# Patient Record
Sex: FEMALE | Race: WHITE | NOT HISPANIC OR LATINO | Employment: FULL TIME | URBAN - METROPOLITAN AREA
[De-identification: names, ages, dates, MRNs, and addresses within clinical notes are randomized per-mention and may not be internally consistent; named-entity substitution may affect disease eponyms.]

---

## 2023-12-13 ENCOUNTER — TELEPHONE (OUTPATIENT)
Dept: PRIMARY CARE | Facility: CLINIC | Age: 63
End: 2023-12-13
Payer: COMMERCIAL

## 2023-12-19 DIAGNOSIS — I10 BENIGN HYPERTENSION: ICD-10-CM

## 2023-12-19 DIAGNOSIS — E03.8 OTHER SPECIFIED HYPOTHYROIDISM: Primary | ICD-10-CM

## 2023-12-24 RX ORDER — LEVOTHYROXINE SODIUM 112 UG/1
112 TABLET ORAL DAILY
Qty: 90 TABLET | Refills: 0 | Status: SHIPPED | OUTPATIENT
Start: 2023-12-24 | End: 2024-01-23 | Stop reason: SDUPTHER

## 2023-12-24 RX ORDER — HYDROCHLOROTHIAZIDE 12.5 MG/1
12.5 TABLET ORAL DAILY
Qty: 90 TABLET | Refills: 0 | Status: SHIPPED | OUTPATIENT
Start: 2023-12-24 | End: 2024-03-06

## 2023-12-24 RX ORDER — AMLODIPINE BESYLATE 10 MG/1
10 TABLET ORAL DAILY
Qty: 90 TABLET | Refills: 0 | Status: SHIPPED | OUTPATIENT
Start: 2023-12-24 | End: 2024-03-06

## 2024-01-03 ENCOUNTER — ANCILLARY PROCEDURE (OUTPATIENT)
Dept: RADIOLOGY | Facility: CLINIC | Age: 64
End: 2024-01-03
Payer: COMMERCIAL

## 2024-01-03 DIAGNOSIS — R91.8 OTHER NONSPECIFIC ABNORMAL FINDING OF LUNG FIELD: ICD-10-CM

## 2024-01-03 PROCEDURE — 71271 CT THORAX LUNG CANCER SCR C-: CPT

## 2024-01-08 ENCOUNTER — TELEPHONE (OUTPATIENT)
Dept: PRIMARY CARE | Facility: CLINIC | Age: 64
End: 2024-01-08
Payer: COMMERCIAL

## 2024-01-09 ENCOUNTER — TELEPHONE (OUTPATIENT)
Dept: PRIMARY CARE | Facility: CLINIC | Age: 64
End: 2024-01-09
Payer: COMMERCIAL

## 2024-01-11 ENCOUNTER — TELEPHONE (OUTPATIENT)
Dept: PRIMARY CARE | Facility: CLINIC | Age: 64
End: 2024-01-11
Payer: COMMERCIAL

## 2024-01-11 DIAGNOSIS — R91.8 MULTIPLE LUNG NODULES: Primary | ICD-10-CM

## 2024-01-11 NOTE — TELEPHONE ENCOUNTER
----- Message from VELIA Villegas-CNP sent at 1/11/2024  2:17 PM EST -----  Regarding: please schedule for annual exam  Please schedule for annual exam

## 2024-01-16 PROBLEM — K21.9 GASTROESOPHAGEAL REFLUX DISEASE: Status: ACTIVE | Noted: 2020-11-17

## 2024-01-16 PROBLEM — E03.9 HYPOTHYROIDISM: Status: ACTIVE | Noted: 2023-02-06

## 2024-01-16 PROBLEM — S83.262A: Status: ACTIVE | Noted: 2024-01-16

## 2024-01-16 PROBLEM — J34.2 NASAL SEPTAL DEVIATION: Status: ACTIVE | Noted: 2024-01-16

## 2024-01-16 PROBLEM — M54.30 SCIATICA: Status: ACTIVE | Noted: 2024-01-16

## 2024-01-16 PROBLEM — J06.9 UPPER RESPIRATORY TRACT INFECTION: Status: ACTIVE | Noted: 2024-01-16

## 2024-01-16 PROBLEM — L65.9 NONSCARRING HAIR LOSS, UNSPECIFIED: Status: ACTIVE | Noted: 2017-10-23

## 2024-01-16 PROBLEM — I10 HYPERTENSION, BENIGN: Status: ACTIVE | Noted: 2020-11-17

## 2024-01-16 PROBLEM — R09.89 RIGHT CAROTID BRUIT: Status: ACTIVE | Noted: 2024-01-16

## 2024-01-16 PROBLEM — R69 FEELING SICK: Status: ACTIVE | Noted: 2024-01-16

## 2024-01-16 PROBLEM — L82.1 OTHER SEBORRHEIC KERATOSIS: Status: ACTIVE | Noted: 2017-10-23

## 2024-01-16 PROBLEM — E78.5 HYPERLIPIDEMIA: Status: ACTIVE | Noted: 2020-11-17

## 2024-01-16 PROBLEM — M16.9 OSTEOARTHRITIS OF HIP: Status: ACTIVE | Noted: 2024-01-16

## 2024-01-16 PROBLEM — M25.562 LEFT KNEE PAIN: Status: ACTIVE | Noted: 2024-01-16

## 2024-01-16 PROBLEM — R53.83 FATIGUE: Status: ACTIVE | Noted: 2023-02-06

## 2024-01-16 PROBLEM — F19.21 HISTORY OF SUBSTANCE DEPENDENCE (MULTI): Status: ACTIVE | Noted: 2024-01-16

## 2024-01-16 PROBLEM — S83.222A PERIPHERAL TEAR OF MEDIAL MENISCUS OF LEFT KNEE, INITIAL ENCOUNTER: Status: ACTIVE | Noted: 2024-01-16

## 2024-01-16 PROBLEM — Z86.19 HISTORY OF VARICELLA: Status: ACTIVE | Noted: 2024-01-16

## 2024-01-16 PROBLEM — D22.5 MELANOCYTIC NEVI OF TRUNK: Status: ACTIVE | Noted: 2017-10-23

## 2024-01-16 PROBLEM — J34.89 NASAL DRAINAGE: Status: ACTIVE | Noted: 2024-01-16

## 2024-01-16 PROBLEM — R68.89 FEELING SICK: Status: ACTIVE | Noted: 2024-01-16

## 2024-01-16 PROBLEM — M70.60 GREATER TROCHANTERIC BURSITIS: Status: ACTIVE | Noted: 2024-01-16

## 2024-01-16 PROBLEM — L73.8 OTHER SPECIFIED FOLLICULAR DISORDERS: Status: ACTIVE | Noted: 2017-10-23

## 2024-01-16 PROBLEM — M25.559 ARTHRALGIA OF HIP: Status: ACTIVE | Noted: 2024-01-16

## 2024-01-16 PROBLEM — R31.9 HEMATURIA: Status: ACTIVE | Noted: 2023-02-06

## 2024-01-16 PROBLEM — Z86.39 HISTORY OF THYROID DISORDER: Status: ACTIVE | Noted: 2024-01-16

## 2024-01-16 PROBLEM — R09.89 CAROTID BRUIT: Status: ACTIVE | Noted: 2024-01-16

## 2024-01-16 PROBLEM — E66.3 OVERWEIGHT: Status: ACTIVE | Noted: 2024-01-16

## 2024-01-16 PROBLEM — I44.7 LEFT BUNDLE BRANCH BLOCK (LBBB) ON ELECTROCARDIOGRAM: Status: ACTIVE | Noted: 2024-01-16

## 2024-01-16 PROBLEM — M54.31 SCIATICA OF RIGHT SIDE: Status: ACTIVE | Noted: 2024-01-16

## 2024-01-16 PROBLEM — R29.818 SUSPECTED SLEEP APNEA: Status: ACTIVE | Noted: 2024-01-16

## 2024-01-16 PROBLEM — M25.531 RIGHT WRIST PAIN: Status: ACTIVE | Noted: 2024-01-16

## 2024-01-16 PROBLEM — R69 DISEASE SUSPECTED: Status: ACTIVE | Noted: 2024-01-16

## 2024-01-16 PROBLEM — M25.552 LEFT HIP PAIN: Status: ACTIVE | Noted: 2024-01-16

## 2024-01-16 PROBLEM — Z86.79 HISTORY OF HYPERTENSION: Status: ACTIVE | Noted: 2024-01-16

## 2024-01-16 PROBLEM — J34.3 HYPERTROPHY OF BOTH INFERIOR NASAL TURBINATES: Status: ACTIVE | Noted: 2024-01-16

## 2024-01-16 PROBLEM — D23.9 OTHER BENIGN NEOPLASM OF SKIN, UNSPECIFIED: Status: ACTIVE | Noted: 2017-10-23

## 2024-01-16 PROBLEM — R73.09 OTHER ABNORMAL GLUCOSE: Status: ACTIVE | Noted: 2023-02-06

## 2024-01-16 PROBLEM — K64.4 EXTERNAL HEMORRHOIDS: Status: ACTIVE | Noted: 2022-11-16

## 2024-01-16 PROBLEM — L81.4 OTHER MELANIN HYPERPIGMENTATION: Status: ACTIVE | Noted: 2017-10-23

## 2024-01-16 PROBLEM — R06.83 SNORING: Status: ACTIVE | Noted: 2024-01-16

## 2024-01-16 PROBLEM — D18.01 HEMANGIOMA OF SKIN AND SUBCUTANEOUS TISSUE: Status: ACTIVE | Noted: 2017-10-23

## 2024-01-16 PROBLEM — E55.9 VITAMIN D DEFICIENCY: Status: ACTIVE | Noted: 2023-02-06

## 2024-01-16 PROBLEM — R31.21 ASYMPTOMATIC MICROSCOPIC HEMATURIA: Status: ACTIVE | Noted: 2024-01-16

## 2024-01-16 PROBLEM — I44.7 LEFT BUNDLE BRANCH BLOCK (LBBB): Status: ACTIVE | Noted: 2023-02-06

## 2024-01-16 RX ORDER — ROSUVASTATIN CALCIUM 10 MG/1
TABLET, COATED ORAL
COMMUNITY
Start: 2021-11-03 | End: 2024-01-17 | Stop reason: SINTOL

## 2024-01-16 RX ORDER — LISINOPRIL 20 MG/1
TABLET ORAL
COMMUNITY
Start: 2018-12-14

## 2024-01-16 RX ORDER — LORATADINE 10 MG/1
1 TABLET ORAL DAILY PRN
COMMUNITY

## 2024-01-16 RX ORDER — LUTEIN 6 MG
TABLET ORAL
COMMUNITY
Start: 2005-11-16 | End: 2024-01-17

## 2024-01-16 RX ORDER — SODIUM, POTASSIUM,MAG SULFATES 17.5-3.13G
SOLUTION, RECONSTITUTED, ORAL ORAL
COMMUNITY
Start: 2022-10-04 | End: 2024-01-17 | Stop reason: ALTCHOICE

## 2024-01-16 RX ORDER — ERGOCALCIFEROL 1.25 MG/1
CAPSULE ORAL
COMMUNITY
Start: 2018-08-29

## 2024-01-16 RX ORDER — MULTIVITAMIN WITH IRON
TABLET ORAL
COMMUNITY
Start: 2005-11-16

## 2024-01-16 RX ORDER — VALACYCLOVIR HYDROCHLORIDE 1 G/1
TABLET, FILM COATED ORAL
COMMUNITY
Start: 2023-10-06 | End: 2024-01-17 | Stop reason: ALTCHOICE

## 2024-01-17 ENCOUNTER — LAB (OUTPATIENT)
Dept: LAB | Facility: LAB | Age: 64
End: 2024-01-17
Payer: COMMERCIAL

## 2024-01-17 ENCOUNTER — OFFICE VISIT (OUTPATIENT)
Dept: PRIMARY CARE | Facility: CLINIC | Age: 64
End: 2024-01-17
Payer: COMMERCIAL

## 2024-01-17 VITALS
BODY MASS INDEX: 30.99 KG/M2 | HEIGHT: 65 IN | WEIGHT: 186 LBS | OXYGEN SATURATION: 98 % | TEMPERATURE: 97.9 F | SYSTOLIC BLOOD PRESSURE: 123 MMHG | RESPIRATION RATE: 16 BRPM | HEART RATE: 71 BPM | DIASTOLIC BLOOD PRESSURE: 77 MMHG

## 2024-01-17 DIAGNOSIS — Z86.39 HISTORY OF THYROID DISORDER: ICD-10-CM

## 2024-01-17 DIAGNOSIS — I10 HYPERTENSION, BENIGN: ICD-10-CM

## 2024-01-17 DIAGNOSIS — Z12.31 SCREENING MAMMOGRAM FOR BREAST CANCER: ICD-10-CM

## 2024-01-17 DIAGNOSIS — Z00.00 ANNUAL PHYSICAL EXAM: ICD-10-CM

## 2024-01-17 DIAGNOSIS — E78.49 OTHER HYPERLIPIDEMIA: ICD-10-CM

## 2024-01-17 DIAGNOSIS — Z13.89 SCREENING FOR HEMATURIA OR PROTEINURIA: ICD-10-CM

## 2024-01-17 DIAGNOSIS — Z23 IMMUNIZATION DUE: ICD-10-CM

## 2024-01-17 DIAGNOSIS — Z00.00 ANNUAL PHYSICAL EXAM: Primary | ICD-10-CM

## 2024-01-17 LAB
ALBUMIN SERPL BCP-MCNC: 4.5 G/DL (ref 3.4–5)
ALP SERPL-CCNC: 62 U/L (ref 33–136)
ALT SERPL W P-5'-P-CCNC: 15 U/L (ref 7–45)
ANION GAP SERPL CALC-SCNC: 12 MMOL/L (ref 10–20)
AST SERPL W P-5'-P-CCNC: 13 U/L (ref 9–39)
BILIRUB SERPL-MCNC: 0.6 MG/DL (ref 0–1.2)
BUN SERPL-MCNC: 17 MG/DL (ref 6–23)
CALCIUM SERPL-MCNC: 9.4 MG/DL (ref 8.6–10.3)
CHLORIDE SERPL-SCNC: 105 MMOL/L (ref 98–107)
CHOLEST SERPL-MCNC: 218 MG/DL (ref 0–199)
CHOLESTEROL/HDL RATIO: 3.6
CO2 SERPL-SCNC: 27 MMOL/L (ref 21–32)
CREAT SERPL-MCNC: 0.72 MG/DL (ref 0.5–1.05)
EGFRCR SERPLBLD CKD-EPI 2021: >90 ML/MIN/1.73M*2
ERYTHROCYTE [DISTWIDTH] IN BLOOD BY AUTOMATED COUNT: 12.6 % (ref 11.5–14.5)
EST. AVERAGE GLUCOSE BLD GHB EST-MCNC: 111 MG/DL
GLUCOSE SERPL-MCNC: 93 MG/DL (ref 74–99)
HBA1C MFR BLD: 5.5 %
HCT VFR BLD AUTO: 43.8 % (ref 36–46)
HDLC SERPL-MCNC: 61.2 MG/DL
HGB BLD-MCNC: 14.2 G/DL (ref 12–16)
LDLC SERPL CALC-MCNC: 143 MG/DL
MCH RBC QN AUTO: 32.1 PG (ref 26–34)
MCHC RBC AUTO-ENTMCNC: 32.4 G/DL (ref 32–36)
MCV RBC AUTO: 99 FL (ref 80–100)
NON HDL CHOLESTEROL: 157 MG/DL (ref 0–149)
NRBC BLD-RTO: 0 /100 WBCS (ref 0–0)
PLATELET # BLD AUTO: 359 X10*3/UL (ref 150–450)
POC APPEARANCE, URINE: CLEAR
POC BILIRUBIN, URINE: NEGATIVE
POC BLOOD, URINE: NEGATIVE
POC COLOR, URINE: YELLOW
POC GLUCOSE, URINE: NEGATIVE MG/DL
POC KETONES, URINE: NEGATIVE MG/DL
POC LEUKOCYTES, URINE: NEGATIVE
POC NITRITE,URINE: NEGATIVE
POC PH, URINE: 6 PH
POC PROTEIN, URINE: NEGATIVE MG/DL
POC SPECIFIC GRAVITY, URINE: 1.02
POC UROBILINOGEN, URINE: 0.2 EU/DL
POTASSIUM SERPL-SCNC: 4.6 MMOL/L (ref 3.5–5.3)
PROT SERPL-MCNC: 7 G/DL (ref 6.4–8.2)
RBC # BLD AUTO: 4.42 X10*6/UL (ref 4–5.2)
SODIUM SERPL-SCNC: 139 MMOL/L (ref 136–145)
TRIGL SERPL-MCNC: 68 MG/DL (ref 0–149)
TSH SERPL-ACNC: 2.52 MIU/L (ref 0.44–3.98)
VLDL: 14 MG/DL (ref 0–40)
WBC # BLD AUTO: 9.4 X10*3/UL (ref 4.4–11.3)

## 2024-01-17 PROCEDURE — 36415 COLL VENOUS BLD VENIPUNCTURE: CPT

## 2024-01-17 PROCEDURE — 81003 URINALYSIS AUTO W/O SCOPE: CPT | Performed by: NURSE PRACTITIONER

## 2024-01-17 PROCEDURE — 3074F SYST BP LT 130 MM HG: CPT | Performed by: NURSE PRACTITIONER

## 2024-01-17 PROCEDURE — 85027 COMPLETE CBC AUTOMATED: CPT

## 2024-01-17 PROCEDURE — 99396 PREV VISIT EST AGE 40-64: CPT | Performed by: NURSE PRACTITIONER

## 2024-01-17 PROCEDURE — 83036 HEMOGLOBIN GLYCOSYLATED A1C: CPT

## 2024-01-17 PROCEDURE — 80053 COMPREHEN METABOLIC PANEL: CPT

## 2024-01-17 PROCEDURE — 84443 ASSAY THYROID STIM HORMONE: CPT

## 2024-01-17 PROCEDURE — 1036F TOBACCO NON-USER: CPT | Performed by: NURSE PRACTITIONER

## 2024-01-17 PROCEDURE — 80061 LIPID PANEL: CPT

## 2024-01-17 PROCEDURE — 90471 IMMUNIZATION ADMIN: CPT | Mod: ZK | Performed by: NURSE PRACTITIONER

## 2024-01-17 PROCEDURE — 3078F DIAST BP <80 MM HG: CPT | Performed by: NURSE PRACTITIONER

## 2024-01-17 PROCEDURE — 99214 OFFICE O/P EST MOD 30 MIN: CPT | Mod: ZK | Performed by: NURSE PRACTITIONER

## 2024-01-17 RX ORDER — ASPIRIN 81 MG/1
81 TABLET ORAL DAILY
COMMUNITY

## 2024-01-17 SDOH — ECONOMIC STABILITY: FOOD INSECURITY: WITHIN THE PAST 12 MONTHS, THE FOOD YOU BOUGHT JUST DIDN'T LAST AND YOU DIDN'T HAVE MONEY TO GET MORE.: NEVER TRUE

## 2024-01-17 SDOH — ECONOMIC STABILITY: FOOD INSECURITY: WITHIN THE PAST 12 MONTHS, YOU WORRIED THAT YOUR FOOD WOULD RUN OUT BEFORE YOU GOT MONEY TO BUY MORE.: NEVER TRUE

## 2024-01-17 ASSESSMENT — PAIN SCALES - GENERAL: PAINLEVEL: 6

## 2024-01-17 ASSESSMENT — ENCOUNTER SYMPTOMS
DEPRESSION: 0
LOSS OF SENSATION IN FEET: 0
OCCASIONAL FEELINGS OF UNSTEADINESS: 0

## 2024-01-17 ASSESSMENT — PATIENT HEALTH QUESTIONNAIRE - PHQ9
SUM OF ALL RESPONSES TO PHQ9 QUESTIONS 1 AND 2: 0
2. FEELING DOWN, DEPRESSED OR HOPELESS: NOT AT ALL
1. LITTLE INTEREST OR PLEASURE IN DOING THINGS: NOT AT ALL

## 2024-01-17 ASSESSMENT — COLUMBIA-SUICIDE SEVERITY RATING SCALE - C-SSRS
1. IN THE PAST MONTH, HAVE YOU WISHED YOU WERE DEAD OR WISHED YOU COULD GO TO SLEEP AND NOT WAKE UP?: NO
6. HAVE YOU EVER DONE ANYTHING, STARTED TO DO ANYTHING, OR PREPARED TO DO ANYTHING TO END YOUR LIFE?: NO
2. HAVE YOU ACTUALLY HAD ANY THOUGHTS OF KILLING YOURSELF?: NO

## 2024-01-17 NOTE — PROGRESS NOTES
Subjective   Patient ID: Mayra Ocasio is a 63 y.o. female who presents for Annual Exam (Annual Exam).  HPI63 y.o. female with past medical history of hypertension, hyperlipidemia, right carotid bruit, hypothyroidism, vitamin D deficiency, GERD and smoking/vaping history presents today for annual exam.    Wears glasses - last exam 2 years.  She will schedule  Last dental exam a year ago.   2/6/2023 mammogram.  Requisition given  Follows with Dr. Redman for gyn needs.   CT Chest low dose - mild emphysema and stable pulmonary nodules measuring up to 5 mm.  Advised to quit vaping - 3 pods per day.    Colonoscopy a couple of years ago - [facility]  Declines hiv testing.     Review of Systems  Review of systems: Present-feeling well. Not present-chills, fatigue and fever.  Skin: Not present-new lesions and rash.  HEENT: Not present-headache, ear pain, nasal congestion, and sore throat.  Neck: Not present-neck pain, neck stiffness and swollen glands.  Respiratory: Not present-difficulty breathing, cough, bloody sputum.  Cardiovascular: Not present-chest pain, leg swelling, palpitations, dyspnea on exertion.  Gastrointestinal: Not present-abdominal pain, bloody or very black stools, changes in bowel habits, heartburn, nausea and vomiting.  Genitourinary: Not present-change in bladder habits, hematuria, flank pain and dysuria.  Musculoskeletal: Not present-back pain, joint pain, joint swelling, joint redness.  Neurological: Not present-dizziness, headache, numbness or tingling.  Psychiatric: Not present-anxiety, impaired cognitive functioning, insomnia, depression, trouble falling asleep, panic attacks, suicidal ideation, suicidal planning, thoughts of hurting others and thoughts of self-harm.  Endocrine: hypothyroid.  Not present-appetite changes, polydipsia, polyuria.  Hematology:  Not present-anemia, excessive bleeding, bruising and epistaxis.    Objective   /77 (BP Location: Right arm, Patient Position: Sitting, BP  "Cuff Size: Adult)   Pulse 71   Temp 36.6 °C (97.9 °F) (Temporal)   Resp 16   Ht 1.651 m (5' 5\")   Wt 84.4 kg (186 lb)   SpO2 98%   BMI 30.95 kg/m²      Physical Exam  Gen.: Mental status-alert. Gen. appearance-cooperative, well groomed and consistent with stated age. Not in acute distress or sickly. Orientation-oriented to time, place, purpose and person. Build and nutrition-well-nourished and well-developed. Hydration-well-hydrated.    Integumentary: Color-normal coloration skin. Skin moisture-normal skin moisture.    Head and neck: Head-normocephalic, atraumatic with no lesions or palpable masses. Face-atraumatic. Neck-full range of motion and subtle. No lymphadenopathy and no nuchal rigidity. Thyroid-normal size and consistency with no palpable lumps.    ENMT: Ears-no tenderness noted to the external auditory canal-bilaterally. Otoscopic exam: Tympanic membranes-left-tympanic membrane is gray in appearance. Right-tympanic membrane is gray in appearance. Nose -frontal sinuses-non-tender and no purulent drainage. Maxillary sinuses-non-tender and no purulent drainage. Mouth: Oral mucosa-pink and moist. Oropharynx: No airway distress, bulging of the pharyngeal wall, edema of the uvula, and no pharyngeal erythema.    Chest and lung exam: Auscultation-normal breath sounds, no adventitious lung sounds and normal vocal resonance. Chest wall is normal in shape and non-tender.    Cardiovascular: Auscultation: Regular rate and rhythm. Heart sounds-normal heart sounds, S1-S2. No murmurs or gallops appreciated. Carotid arteries normal and without bruit.    Abdomen: Non-tender, no rigidity, and no palpable masses. There is no hepatosplenomegaly. Auscultation-auscultation of the abdomen reveals normal bowel sounds throughout. No CVAT.     Peripheral vascular: Lower extremity-inspection is normal bilaterally. Normal temperature and no edema bilaterally.    Neurologic: Mental [id number]. Cranial nerves: " Cranial nerves II through XII grossly intact. Normal gait.    Musculoskeletal: Examination of the spine with no step-offs or point tenderness.  Full range of motion of the spine without pain or difficulty. Right lower extremity-normal strength and tone, normal range of motion without pain. Left lower extremity-normal strength and tone, normal range of motion without pain.  Assessment/Plan   Diagnoses and all orders for this visit:  Annual physical exam  -     CBC; Future  -     Comprehensive Metabolic Panel; Future  -     Hemoglobin A1C; Future  -     Lipid Panel; Future  -     TSH with reflex to Free T4 if abnormal; Future  Screening mammogram for breast cancer  -     BI mammo bilateral screening tomosynthesis; Future  History of thyroid disorder  -     Hemoglobin A1C; Future  -     TSH with reflex to Free T4 if abnormal; Future  Other hyperlipidemia  -     Hemoglobin A1C; Future  -     Lipid Panel; Future  Hypertension, benign  -     CBC; Future  -     Comprehensive Metabolic Panel; Future  -     Lipid Panel; Future  -     TSH with reflex to Free T4 if abnormal; Future  Immunization due  -     Pneumococcal conjugate vaccine, 20-valent (PREVNAR 20)  Screening for hematuria or proteinuria  -     POCT UA Automated manually resulted

## 2024-01-17 NOTE — PATIENT INSTRUCTIONS
Annual exam with fasting labs to be obtained.    UA unremarkable  Mammogram requisition given.  Patient will be notified of results as they become available.    Patient given Prevnar 20 without reaction per patient's request.    Hypertension-patient blood pressure under control.  Continue to monitor.  Continue with current medical regime.  Contact office with blood pressure readings greater than 150/90 or less than 90/60.  DASH diet.  Weight loss encouraged.    Hyperlipidemia-will wait till labs obtained.  Patient unable to tolerate rosuvastatin or simvastatin due to severe leg pain while on medication.  She is followed by cardiology.    Follow up in 6 months.

## 2024-01-23 ENCOUNTER — TELEPHONE (OUTPATIENT)
Dept: PRIMARY CARE | Facility: CLINIC | Age: 64
End: 2024-01-23
Payer: COMMERCIAL

## 2024-01-23 DIAGNOSIS — E03.8 OTHER SPECIFIED HYPOTHYROIDISM: ICD-10-CM

## 2024-01-23 DIAGNOSIS — E78.49 OTHER HYPERLIPIDEMIA: Primary | ICD-10-CM

## 2024-01-23 RX ORDER — PRAVASTATIN SODIUM 10 MG/1
10 TABLET ORAL DAILY
Qty: 90 TABLET | Refills: 1 | Status: SHIPPED | OUTPATIENT
Start: 2024-01-23 | End: 2024-07-21

## 2024-01-23 RX ORDER — LEVOTHYROXINE SODIUM 112 UG/1
112 TABLET ORAL DAILY
Qty: 90 TABLET | Refills: 0 | Status: SHIPPED | OUTPATIENT
Start: 2024-01-23 | End: 2024-05-18

## 2024-01-23 NOTE — TELEPHONE ENCOUNTER
Telephone call with Mayra.  Patient is not anemic.  Normal kidney and liver function.  Normal thyroid.  Normal hemoglobin A1c.  You are not diabetic.  Elevated LDLs noted.  We will try her on low-dose pravastatin.  Follow-up in 3 months or sooner as needed.

## 2024-03-14 DIAGNOSIS — K21.9 GERD WITHOUT ESOPHAGITIS: ICD-10-CM

## 2024-03-15 RX ORDER — FAMOTIDINE 40 MG/1
40 TABLET, FILM COATED ORAL DAILY
Qty: 90 TABLET | Refills: 0 | Status: SHIPPED | OUTPATIENT
Start: 2024-03-15 | End: 2024-05-28

## 2024-03-20 ENCOUNTER — HOSPITAL ENCOUNTER (OUTPATIENT)
Dept: RADIOLOGY | Facility: CLINIC | Age: 64
Discharge: HOME | End: 2024-03-20
Payer: COMMERCIAL

## 2024-03-20 DIAGNOSIS — Z12.31 SCREENING MAMMOGRAM FOR BREAST CANCER: ICD-10-CM

## 2024-03-20 PROCEDURE — 77067 SCR MAMMO BI INCL CAD: CPT

## 2024-03-20 PROCEDURE — 77063 BREAST TOMOSYNTHESIS BI: CPT | Performed by: RADIOLOGY

## 2024-03-20 PROCEDURE — 77067 SCR MAMMO BI INCL CAD: CPT | Performed by: RADIOLOGY

## 2024-03-22 DIAGNOSIS — N63.11 MASS OF UPPER OUTER QUADRANT OF RIGHT BREAST: Primary | ICD-10-CM

## 2024-03-26 ENCOUNTER — HOSPITAL ENCOUNTER (OUTPATIENT)
Dept: RADIOLOGY | Facility: CLINIC | Age: 64
Discharge: HOME | End: 2024-03-26
Payer: COMMERCIAL

## 2024-03-26 DIAGNOSIS — N63.11 MASS OF UPPER OUTER QUADRANT OF RIGHT BREAST: ICD-10-CM

## 2024-03-26 PROCEDURE — 77061 BREAST TOMOSYNTHESIS UNI: CPT | Mod: RIGHT SIDE | Performed by: RADIOLOGY

## 2024-03-26 PROCEDURE — 76642 ULTRASOUND BREAST LIMITED: CPT | Mod: RT

## 2024-03-26 PROCEDURE — 76642 ULTRASOUND BREAST LIMITED: CPT | Mod: RIGHT SIDE | Performed by: RADIOLOGY

## 2024-03-26 PROCEDURE — 77065 DX MAMMO INCL CAD UNI: CPT | Mod: RIGHT SIDE | Performed by: RADIOLOGY

## 2024-03-26 PROCEDURE — 77061 BREAST TOMOSYNTHESIS UNI: CPT | Mod: RT

## 2024-03-26 PROCEDURE — 76981 USE PARENCHYMA: CPT | Mod: RT

## 2024-07-29 DIAGNOSIS — I10 BENIGN HYPERTENSION: ICD-10-CM

## 2024-07-29 DIAGNOSIS — E03.8 OTHER SPECIFIED HYPOTHYROIDISM: ICD-10-CM

## 2024-07-30 RX ORDER — LEVOTHYROXINE SODIUM 112 UG/1
112 TABLET ORAL DAILY
Qty: 90 TABLET | Refills: 0 | Status: SHIPPED | OUTPATIENT
Start: 2024-07-30

## 2024-07-30 RX ORDER — AMLODIPINE BESYLATE 10 MG/1
10 TABLET ORAL DAILY
Qty: 90 TABLET | Refills: 0 | Status: SHIPPED | OUTPATIENT
Start: 2024-07-30

## 2024-07-30 RX ORDER — HYDROCHLOROTHIAZIDE 12.5 MG/1
12.5 TABLET ORAL DAILY
Qty: 90 TABLET | Refills: 0 | Status: SHIPPED | OUTPATIENT
Start: 2024-07-30

## 2024-11-05 ENCOUNTER — APPOINTMENT (OUTPATIENT)
Dept: OBSTETRICS AND GYNECOLOGY | Facility: CLINIC | Age: 64
End: 2024-11-05
Payer: MEDICARE

## 2024-11-05 VITALS
HEIGHT: 65 IN | DIASTOLIC BLOOD PRESSURE: 81 MMHG | WEIGHT: 175.6 LBS | BODY MASS INDEX: 29.26 KG/M2 | SYSTOLIC BLOOD PRESSURE: 128 MMHG

## 2024-11-05 DIAGNOSIS — Z12.31 ENCOUNTER FOR SCREENING MAMMOGRAM FOR MALIGNANT NEOPLASM OF BREAST: ICD-10-CM

## 2024-11-05 DIAGNOSIS — Z01.419 ENCOUNTER FOR ANNUAL ROUTINE GYNECOLOGICAL EXAMINATION: Primary | ICD-10-CM

## 2024-11-05 PROCEDURE — 1036F TOBACCO NON-USER: CPT | Performed by: OBSTETRICS & GYNECOLOGY

## 2024-11-05 PROCEDURE — 3008F BODY MASS INDEX DOCD: CPT | Performed by: OBSTETRICS & GYNECOLOGY

## 2024-11-05 PROCEDURE — 3079F DIAST BP 80-89 MM HG: CPT | Performed by: OBSTETRICS & GYNECOLOGY

## 2024-11-05 PROCEDURE — G0101 CA SCREEN;PELVIC/BREAST EXAM: HCPCS | Performed by: OBSTETRICS & GYNECOLOGY

## 2024-11-05 PROCEDURE — 3074F SYST BP LT 130 MM HG: CPT | Performed by: OBSTETRICS & GYNECOLOGY

## 2024-11-05 RX ORDER — EZETIMIBE 10 MG/1
10 TABLET ORAL DAILY
COMMUNITY

## 2024-11-05 NOTE — PROGRESS NOTES
Subjective   Patient ID: Mayra Ocasio is a 63 y.o. female who presents for Medicare Annual Wellness Visit Subsequent (Patient here for medicare annual/Pt has some questions about her last mammogram in 3/2024/Hrt-none/Declined chaperone).  HPI  Patient is a 63-year-old  2 para 2 whose had 2 spontaneous vaginal deliveries.  Patient has been menopausal since age 55 does not use hormone therapy and denies any vaginal bleeding.  She admits to regular bowel movements is up-to-date on her colonoscopy does complain of occasionally leaking urine with stress but would like to follow this expectantly for now.  Last normal Pap smear 2023 had mammogram in 2024 with normal additional views.  Review of Systems    Objective   Physical Exam  Gen.: Alert and in no acute distress. Well-developed, well-nourished.  Thyroid: Nonenlarged and no palpable thyroid nodules  Cardiovascular: Heart regular rate and rhythm  Pulmonary: Clear bilateral breath sounds  Breasts: Normal appearance, no nipple discharge and no skin changes. No palpable masses and no axillary lymphadenopathy  Abdomen: Soft and nontender, no abdominal mass palpated, no organomegaly   Pelvic: External genitalia atrophic with no lesions.  Urethral meatus normal size and location with no lesions.  No evidence of prolapse.  Urethra without masses tenderness or scarring.  Bladder appears normal without fullness masses or tenderness.  Vagina atrophic, no discharge or lesions noted.  Good pelvic support.  Cervix is clean no lesions or discharge is noted.  Uterus small freely mobile and nontender without prolapse.  Adnexa without masses or tenderness.  Perianal area and perineum normal without any lesions.  Assessment/Plan   Normal annual GYN exam, Pap and HPV not needed this year, patient given a requisition for mammogram.  She will follow-up in 1 year or as needed.         Nabil Redman MD 24 2:14 PM

## 2024-12-04 ENCOUNTER — TELEPHONE (OUTPATIENT)
Dept: PRIMARY CARE | Facility: CLINIC | Age: 64
End: 2024-12-04
Payer: MEDICARE

## 2024-12-04 DIAGNOSIS — I10 BENIGN HYPERTENSION: ICD-10-CM

## 2024-12-04 DIAGNOSIS — E78.49 OTHER HYPERLIPIDEMIA: ICD-10-CM

## 2024-12-04 DIAGNOSIS — I10 HYPERTENSION, BENIGN: Primary | ICD-10-CM

## 2024-12-04 DIAGNOSIS — E03.8 OTHER SPECIFIED HYPOTHYROIDISM: ICD-10-CM

## 2024-12-04 DIAGNOSIS — K21.9 GERD WITHOUT ESOPHAGITIS: ICD-10-CM

## 2024-12-04 RX ORDER — EZETIMIBE 10 MG/1
10 TABLET ORAL DAILY
Qty: 90 TABLET | Refills: 0 | Status: SHIPPED | OUTPATIENT
Start: 2024-12-04

## 2024-12-04 RX ORDER — LISINOPRIL 20 MG/1
20 TABLET ORAL DAILY
Qty: 90 TABLET | Refills: 0 | Status: SHIPPED | OUTPATIENT
Start: 2024-12-04 | End: 2025-12-04

## 2024-12-04 RX ORDER — LEVOTHYROXINE SODIUM 112 UG/1
112 TABLET ORAL DAILY
Qty: 90 TABLET | Refills: 0 | Status: SHIPPED | OUTPATIENT
Start: 2024-12-04

## 2024-12-04 RX ORDER — FAMOTIDINE 40 MG/1
40 TABLET, FILM COATED ORAL DAILY
Qty: 90 TABLET | Refills: 0 | Status: SHIPPED | OUTPATIENT
Start: 2024-12-04

## 2024-12-04 RX ORDER — HYDROCHLOROTHIAZIDE 12.5 MG/1
12.5 TABLET ORAL DAILY
Qty: 90 TABLET | Refills: 0 | Status: SHIPPED | OUTPATIENT
Start: 2024-12-04

## 2024-12-04 RX ORDER — AMLODIPINE BESYLATE 10 MG/1
10 TABLET ORAL DAILY
Qty: 90 TABLET | Refills: 0 | Status: SHIPPED | OUTPATIENT
Start: 2024-12-04

## 2024-12-04 RX ORDER — LISINOPRIL 20 MG/1
20 TABLET ORAL DAILY
Qty: 30 TABLET | Refills: 0 | Status: SHIPPED | OUTPATIENT
Start: 2024-12-04 | End: 2024-12-04 | Stop reason: SDUPTHER

## 2024-12-04 NOTE — TELEPHONE ENCOUNTER
Patient requesting new prescription sent to [facility]   Amlodipine  Hydrochlorothiazide  Lisinopril  Famotidine  Synthroid  Vitamin D    Patient is out of Lisinopril and request a 14 day supply sent to [facility]

## 2024-12-05 ENCOUNTER — TELEPHONE (OUTPATIENT)
Dept: PRIMARY CARE | Facility: CLINIC | Age: 64
End: 2024-12-05
Payer: MEDICARE

## 2024-12-05 NOTE — TELEPHONE ENCOUNTER
Patient LVM stating she used up her last medication of Lisinopril 20 mg today.    She will need 14 days supply to be sent to   [facility] on file.    She will not get her medication on time from [facility].

## 2025-01-03 ENCOUNTER — TELEPHONE (OUTPATIENT)
Dept: PRIMARY CARE | Facility: CLINIC | Age: 65
End: 2025-01-03
Payer: COMMERCIAL

## 2025-01-03 ENCOUNTER — TELEPHONE (OUTPATIENT)
Dept: RADIOLOGY | Facility: HOSPITAL | Age: 65
End: 2025-01-03
Payer: MEDICARE

## 2025-01-06 ENCOUNTER — TELEPHONE (OUTPATIENT)
Dept: UROLOGY | Facility: CLINIC | Age: 65
End: 2025-01-06
Payer: COMMERCIAL

## 2025-01-06 DIAGNOSIS — I10 HYPERTENSION, BENIGN: ICD-10-CM

## 2025-01-08 RX ORDER — LISINOPRIL 20 MG/1
20 TABLET ORAL DAILY
Qty: 90 TABLET | Refills: 0 | Status: SHIPPED | OUTPATIENT
Start: 2025-01-08 | End: 2026-01-08

## 2025-01-27 ENCOUNTER — HOSPITAL ENCOUNTER (OUTPATIENT)
Dept: RADIOLOGY | Facility: CLINIC | Age: 65
Discharge: HOME | End: 2025-01-27
Payer: MEDICARE

## 2025-01-27 DIAGNOSIS — Z87.891 PERSONAL HISTORY OF NICOTINE DEPENDENCE: ICD-10-CM

## 2025-01-27 PROCEDURE — 71271 CT THORAX LUNG CANCER SCR C-: CPT

## 2025-01-27 PROCEDURE — 71271 CT THORAX LUNG CANCER SCR C-: CPT | Performed by: RADIOLOGY

## 2025-01-29 ENCOUNTER — OFFICE VISIT (OUTPATIENT)
Dept: PRIMARY CARE | Facility: CLINIC | Age: 65
End: 2025-01-29
Payer: MEDICARE

## 2025-01-29 VITALS
HEART RATE: 63 BPM | TEMPERATURE: 95.9 F | SYSTOLIC BLOOD PRESSURE: 109 MMHG | RESPIRATION RATE: 10 BRPM | BODY MASS INDEX: 29.52 KG/M2 | HEIGHT: 65 IN | WEIGHT: 177.2 LBS | DIASTOLIC BLOOD PRESSURE: 71 MMHG | OXYGEN SATURATION: 95 %

## 2025-01-29 DIAGNOSIS — Z87.891 FORMER CIGARETTE SMOKER: ICD-10-CM

## 2025-01-29 DIAGNOSIS — Z86.39 HISTORY OF THYROID DISORDER: ICD-10-CM

## 2025-01-29 DIAGNOSIS — R82.998 LEUKOCYTES IN URINE: ICD-10-CM

## 2025-01-29 DIAGNOSIS — Z13.89 SCREENING FOR HEMATURIA OR PROTEINURIA: ICD-10-CM

## 2025-01-29 DIAGNOSIS — Z72.89 CURRENT EVERY DAY VAPING: ICD-10-CM

## 2025-01-29 DIAGNOSIS — Z00.00 WELCOME TO MEDICARE PREVENTIVE VISIT: Primary | ICD-10-CM

## 2025-01-29 DIAGNOSIS — R91.8 MULTIPLE PULMONARY NODULES: ICD-10-CM

## 2025-01-29 DIAGNOSIS — E78.49 OTHER HYPERLIPIDEMIA: ICD-10-CM

## 2025-01-29 DIAGNOSIS — I10 HYPERTENSION, BENIGN: ICD-10-CM

## 2025-01-29 DIAGNOSIS — Z78.0 POSTMENOPAUSE: ICD-10-CM

## 2025-01-29 DIAGNOSIS — Z11.59 ENCOUNTER FOR HEPATITIS C SCREENING TEST FOR LOW RISK PATIENT: ICD-10-CM

## 2025-01-29 DIAGNOSIS — Z13.1 SCREENING FOR DIABETES MELLITUS: ICD-10-CM

## 2025-01-29 DIAGNOSIS — E66.3 OVERWEIGHT: ICD-10-CM

## 2025-01-29 DIAGNOSIS — M25.562 LEFT KNEE PAIN, UNSPECIFIED CHRONICITY: ICD-10-CM

## 2025-01-29 DIAGNOSIS — R73.09 ELEVATED GLUCOSE LEVEL: ICD-10-CM

## 2025-01-29 DIAGNOSIS — S16.1XXD STRAIN OF NECK MUSCLE, SUBSEQUENT ENCOUNTER: ICD-10-CM

## 2025-01-29 PROBLEM — K63.5 COLON POLYPS: Status: ACTIVE | Noted: 2022-11-16

## 2025-01-29 PROBLEM — I70.0 AORTIC ATHEROSCLEROSIS (CMS-HCC): Status: ACTIVE | Noted: 2025-01-29

## 2025-01-29 PROBLEM — J31.0 CHRONIC RHINITIS: Status: ACTIVE | Noted: 2025-01-29

## 2025-01-29 PROBLEM — M17.12 ARTHRITIS OF KNEE, LEFT: Status: ACTIVE | Noted: 2025-01-29

## 2025-01-29 PROBLEM — K57.30 COLON, DIVERTICULOSIS: Status: ACTIVE | Noted: 2022-11-16

## 2025-01-29 LAB
POC APPEARANCE, URINE: CLEAR
POC BILIRUBIN, URINE: NEGATIVE
POC BLOOD, URINE: NEGATIVE
POC COLOR, URINE: YELLOW
POC GLUCOSE, URINE: NEGATIVE MG/DL
POC KETONES, URINE: ABNORMAL MG/DL
POC LEUKOCYTES, URINE: ABNORMAL
POC NITRITE,URINE: NEGATIVE
POC PH, URINE: 5.5 PH
POC PROTEIN, URINE: NEGATIVE MG/DL
POC SPECIFIC GRAVITY, URINE: 1.02
POC UROBILINOGEN, URINE: 0.2 EU/DL

## 2025-01-29 PROCEDURE — 3008F BODY MASS INDEX DOCD: CPT | Performed by: NURSE PRACTITIONER

## 2025-01-29 PROCEDURE — 1126F AMNT PAIN NOTED NONE PRSNT: CPT | Performed by: NURSE PRACTITIONER

## 2025-01-29 PROCEDURE — 93005 ELECTROCARDIOGRAM TRACING: CPT | Performed by: NURSE PRACTITIONER

## 2025-01-29 PROCEDURE — 3078F DIAST BP <80 MM HG: CPT | Performed by: NURSE PRACTITIONER

## 2025-01-29 PROCEDURE — G0439 PPPS, SUBSEQ VISIT: HCPCS | Performed by: NURSE PRACTITIONER

## 2025-01-29 PROCEDURE — 3074F SYST BP LT 130 MM HG: CPT | Performed by: NURSE PRACTITIONER

## 2025-01-29 PROCEDURE — 93010 ELECTROCARDIOGRAM REPORT: CPT | Performed by: NURSE PRACTITIONER

## 2025-01-29 PROCEDURE — 1170F FXNL STATUS ASSESSED: CPT | Performed by: NURSE PRACTITIONER

## 2025-01-29 PROCEDURE — 1159F MED LIST DOCD IN RCRD: CPT | Performed by: NURSE PRACTITIONER

## 2025-01-29 PROCEDURE — 99215 OFFICE O/P EST HI 40 MIN: CPT | Performed by: NURSE PRACTITIONER

## 2025-01-29 PROCEDURE — 81003 URINALYSIS AUTO W/O SCOPE: CPT | Mod: QW | Performed by: NURSE PRACTITIONER

## 2025-01-29 RX ORDER — CYCLOBENZAPRINE HCL 5 MG
5 TABLET ORAL NIGHTLY PRN
Qty: 30 TABLET | Refills: 0 | Status: SHIPPED | OUTPATIENT
Start: 2025-01-29 | End: 2025-03-30

## 2025-01-29 RX ORDER — PREDNISONE 20 MG/1
40 TABLET ORAL DAILY
Qty: 10 TABLET | Refills: 0 | Status: SHIPPED | OUTPATIENT
Start: 2025-01-29 | End: 2025-02-03

## 2025-01-29 SDOH — ECONOMIC STABILITY: FOOD INSECURITY: WITHIN THE PAST 12 MONTHS, THE FOOD YOU BOUGHT JUST DIDN'T LAST AND YOU DIDN'T HAVE MONEY TO GET MORE.: NEVER TRUE

## 2025-01-29 SDOH — ECONOMIC STABILITY: FOOD INSECURITY: WITHIN THE PAST 12 MONTHS, YOU WORRIED THAT YOUR FOOD WOULD RUN OUT BEFORE YOU GOT MONEY TO BUY MORE.: NEVER TRUE

## 2025-01-29 ASSESSMENT — PATIENT HEALTH QUESTIONNAIRE - PHQ9
SUM OF ALL RESPONSES TO PHQ9 QUESTIONS 1 AND 2: 0
SUM OF ALL RESPONSES TO PHQ9 QUESTIONS 1 & 2: 0
1. LITTLE INTEREST OR PLEASURE IN DOING THINGS: NOT AT ALL
2. FEELING DOWN, DEPRESSED OR HOPELESS: NOT AT ALL
1. LITTLE INTEREST OR PLEASURE IN DOING THINGS: NOT AT ALL
2. FEELING DOWN, DEPRESSED OR HOPELESS: NOT AT ALL

## 2025-01-29 ASSESSMENT — ANXIETY QUESTIONNAIRES
IF YOU CHECKED OFF ANY PROBLEMS ON THIS QUESTIONNAIRE, HOW DIFFICULT HAVE THESE PROBLEMS MADE IT FOR YOU TO DO YOUR WORK, TAKE CARE OF THINGS AT HOME, OR GET ALONG WITH OTHER PEOPLE: NOT DIFFICULT AT ALL
4. TROUBLE RELAXING: NOT AT ALL
GAD7 TOTAL SCORE: 0
6. BECOMING EASILY ANNOYED OR IRRITABLE: NOT AT ALL
1. FEELING NERVOUS, ANXIOUS, OR ON EDGE: NOT AT ALL
7. FEELING AFRAID AS IF SOMETHING AWFUL MIGHT HAPPEN: NOT AT ALL
3. WORRYING TOO MUCH ABOUT DIFFERENT THINGS: NOT AT ALL
2. NOT BEING ABLE TO STOP OR CONTROL WORRYING: NOT AT ALL
5. BEING SO RESTLESS THAT IT IS HARD TO SIT STILL: NOT AT ALL

## 2025-01-29 ASSESSMENT — ACTIVITIES OF DAILY LIVING (ADL)
TAKING_MEDICATION: INDEPENDENT
MANAGING_FINANCES: INDEPENDENT
GROCERY_SHOPPING: INDEPENDENT
DOING_HOUSEWORK: INDEPENDENT
BATHING: INDEPENDENT
DRESSING: INDEPENDENT

## 2025-01-29 ASSESSMENT — ENCOUNTER SYMPTOMS
LOSS OF SENSATION IN FEET: 0
OCCASIONAL FEELINGS OF UNSTEADINESS: 0
DEPRESSION: 0

## 2025-01-29 ASSESSMENT — COLUMBIA-SUICIDE SEVERITY RATING SCALE - C-SSRS
2. HAVE YOU ACTUALLY HAD ANY THOUGHTS OF KILLING YOURSELF?: NO
1. IN THE PAST MONTH, HAVE YOU WISHED YOU WERE DEAD OR WISHED YOU COULD GO TO SLEEP AND NOT WAKE UP?: NO
6. HAVE YOU EVER DONE ANYTHING, STARTED TO DO ANYTHING, OR PREPARED TO DO ANYTHING TO END YOUR LIFE?: NO

## 2025-01-29 ASSESSMENT — PAIN SCALES - GENERAL: PAINLEVEL_OUTOF10: 0-NO PAIN

## 2025-01-29 ASSESSMENT — LIFESTYLE VARIABLES
AUDIT-C TOTAL SCORE: 0
SKIP TO QUESTIONS 9-10: 1
HOW OFTEN DO YOU HAVE SIX OR MORE DRINKS ON ONE OCCASION: NEVER
HOW OFTEN DO YOU HAVE A DRINK CONTAINING ALCOHOL: NEVER
HOW MANY STANDARD DRINKS CONTAINING ALCOHOL DO YOU HAVE ON A TYPICAL DAY: PATIENT DOES NOT DRINK

## 2025-01-29 NOTE — PROGRESS NOTES
Subjective   Patient ID: Mayra Ocasio is a 64 y.o. female who presents for No chief complaint on file..  HPI 64-year-old female with past medical history of GERD, hemorrhoids, hypertension, history of substance dependence, hyperlipidemia, hypothyroidism, left bundle branch block, arthralgia, osteoarthritis, right sciatica, carotid bruit, chronic sinusitis, diverticulosis, multiple lung nodules being followed in lung nodule clinic presents today for Medicare wellness exam.    Health care team  Follows with Dr Rojas/Balwinder - LBB and sinus arrhythmias - needs follow up - patient to schedule.   Presently asymptomatic   Last eye exam - October 14,2024 --start of cataracts with follow up in 6 months.   Last dental exam - last fall  Last mammogram  - scheduled 3/25/2025  Last pap - Dr. Redman following-- hpv history LEEP   Colonoscopy - patient to schedule -unsure as to when she had it-- no available information in epic - Dr. Reese - history of diverticulosis.  Declines immunizations today    Review of Systems  Review of systems: Present-feeling well. Not present-chills, fatigue and fever.  Skin: Not present-new lesions and rash.  HEENT: Not present-headache, ear pain, nasal congestion, and sore throat.  Neck: Not present-neck pain, neck stiffness and swollen glands.  Respiratory: Not present-difficulty breathing, cough, bloody sputum.  Cardiovascular: Not present-abnormal blood pressure, chest pain, edema, palpitations, dyspnea on exertion.  Gastrointestinal: Not present-abdominal pain, bloody or very black stools, changes in bowel habits, heartburn, nausea and vomiting.  Genitourinary: Not present-change in bladder habits, hematuria, flank pain and dysuria.  Musculoskeletal: neck muscle strain. Not present- joint pain, joint swelling, joint redness.  Neurological: Not present-dizziness, headache, numbness or tingling.  Psychiatric: Not present-anxiety, impaired cognitive functioning, insomnia, depression, trouble  falling asleep, panic attacks, suicidal ideation, suicidal planning, thoughts of hurting others and thoughts of self-harm.  Endocrine: Not present-appetite changes, polydipsia, polyuria, and thyroid problems.  Hematology:  Not present-anemia, excessive bleeding, bruising and epistaxis.    Objective   There were no vitals taken for this visit.     Physical Exam  Gen.: Mental status-alert. Gen. appearance-cooperative, well groomed and consistent with stated age. Not in acute distress or sickly. Orientation-oriented to time, place, purpose and person. Build and nutrition-well-nourished and well-developed. Hydration-well-hydrated.    Integumentary: Color-normal coloration skin. Skin moisture-normal skin moisture.    Head and neck: Head-normocephalic, atraumatic with no lesions or palpable masses. Face-atraumatic. Neck-full range of motion and subtle. Trapezius muscle tension.   No lymphadenopathy and no nuchal rigidity. Thyroid-normal size and consistency with no palpable lumps.    ENMT: Ears-no tenderness noted to the external auditory canal-bilaterally. Otoscopic exam: Tympanic membranes-left-tympanic membrane is gray in appearance. Right-tympanic membrane is gray in appearance. Nose -frontal sinuses-non-tender and no purulent drainage. Maxillary sinuses-non-tender and no purulent drainage. Mouth: Oral mucosa-pink and moist. Oropharynx: No airway distress, bulging of the pharyngeal wall, edema of the uvula, and no pharyngeal erythema.    Chest and lung exam: Chest and lung exam reveals-normal excursion with symmetric chest walls, quiet, even and easy respiratory effort with no use of accessory muscles.  Auscultation-normal breath sounds, no adventitious lung sounds and normal vocal resonance. Chest wall is normal in shape and non-tender.    Cardiovascular: Auscultation: Regular rate and rhythm. Heart sounds-normal heart sounds, S1-S2. No murmurs or gallops appreciated. Carotid arteries normal and without  bruit.    Abdomen: Non-tender, no rigidity, and no palpable masses. There is no hepatosplenomegaly. Auscultation-auscultation of the abdomen reveals normal bowel sounds throughout.     Peripheral vascular: Normal temperature and no edema bilaterally.    Neurologic: Mental [id number]. Cranial nerves: Cranial nerves II through XII grossly intact. Normal gait.    Musculoskeletal: Examination of the spine with no step-offs or point tenderness.  Full range of motion of the spine without pain or difficulty.   Trapezius muscle tension.  Full rom of shoulders bilaterally.     Equal hand grasps and bicep strength.  No obvious deformity or lesions.    Right lower extremity-normal strength and tone, normal range of motion without pain.   Left lower extremity-normal strength and tone, normal range of motion with discomfort to left knee chronic   Assessment/Plan   Diagnoses and all orders for this visit:  Welcome to Medicare preventive visit  -     Hepatitis C antibody; Future  -     ECG 12 lead (Clinic Performed)  -     Eye exam  -     Hearing screen  History of thyroid disorder  -     TSH with reflex to Free T4 if abnormal; Future  Hypertension, benign  -     CBC; Future  -     Comprehensive Metabolic Panel; Future  Other hyperlipidemia  -     Hemoglobin A1C; Future  -     Lipid Panel; Future  Screening for hematuria or proteinuria  -     POCT UA Automated manually resulted  Overweight  -     Hemoglobin A1C; Future  -     Lipid Panel; Future  -     TSH with reflex to Free T4 if abnormal; Future  Screening for diabetes mellitus  -     Hemoglobin A1C; Future  Elevated glucose level  -     Hemoglobin A1C; Future  Postmenopause  -     XR DEXA bone density; Future  Current every day vaping  -     XR DEXA bone density; Future  -     CT lung screening low dose; Future  Leukocytes in urine  -     Urine Culture  Multiple pulmonary nodules  -     CT lung screening low dose; Future  Former cigarette smoker  -     CT lung  screening low dose; Future  Encounter for hepatitis C screening test for low risk patient  -     Hepatitis C antibody; Future  Strain of neck muscle, subsequent encounter  -     XR cervical spine 2-3 views; Future  -     cyclobenzaprine (Flexeril) 5 mg tablet; Take 1 tablet (5 mg) by mouth as needed at bedtime for muscle spasms.  -     predniSONE (Deltasone) 20 mg tablet; Take 2 tablets (40 mg) by mouth once daily for 5 days.  -     Referral to Orthopaedic Surgery; Future  Left knee pain, unspecified chronicity  -     Referral to Orthopaedic Surgery; Future

## 2025-01-29 NOTE — PATIENT INSTRUCTIONS
Welcome to medicare wellness exam.   Screening fasting labs to be obtained  UA - small leukocytes and traced ketones.   Patient is fasting for labs.  Hearing see attached results - Declines audiology   Eye exam see attached results   Bone density to be obtained.   Vaping and history of lung nodules.  Low dose screen reviewed.  Order placed for next year - patient to schedule  She was encouraged to Quit vaping     She will follow up with dr. Rojas/alejandra - cardiology     Chronic Left knee and shoulder/neck discomfort - she will be following with ortho  She as given a prescription for Cyclobenzaprine as directed.  She was instructed to avoid alcohol and not operate machinery while taking this medication.  She was given a prescription for prednisone as directed.  She declined Medrol Dosepak.    Continue with pepcid   Rest, ice/heat  Contact office with worsening condition or no resolve over the next week.  Follow-up with orthopedics.

## 2025-01-30 LAB
ALBUMIN SERPL-MCNC: 4.6 G/DL (ref 3.6–5.1)
ALP SERPL-CCNC: 65 U/L (ref 37–153)
ALT SERPL-CCNC: 11 U/L (ref 6–29)
ANION GAP SERPL CALCULATED.4IONS-SCNC: 9 MMOL/L (CALC) (ref 7–17)
AST SERPL-CCNC: 12 U/L (ref 10–35)
BILIRUB SERPL-MCNC: 0.8 MG/DL (ref 0.2–1.2)
BUN SERPL-MCNC: 14 MG/DL (ref 7–25)
CALCIUM SERPL-MCNC: 9.5 MG/DL (ref 8.6–10.4)
CHLORIDE SERPL-SCNC: 103 MMOL/L (ref 98–110)
CHOLEST SERPL-MCNC: 191 MG/DL
CHOLEST/HDLC SERPL: 2.9 (CALC)
CO2 SERPL-SCNC: 27 MMOL/L (ref 20–32)
CREAT SERPL-MCNC: 0.65 MG/DL (ref 0.5–1.05)
EGFRCR SERPLBLD CKD-EPI 2021: 98 ML/MIN/1.73M2
ERYTHROCYTE [DISTWIDTH] IN BLOOD BY AUTOMATED COUNT: 12.3 % (ref 11–15)
EST. AVERAGE GLUCOSE BLD GHB EST-MCNC: 114 MG/DL
EST. AVERAGE GLUCOSE BLD GHB EST-SCNC: 6.3 MMOL/L
GLUCOSE SERPL-MCNC: 90 MG/DL (ref 65–99)
HBA1C MFR BLD: 5.6 % OF TOTAL HGB
HCT VFR BLD AUTO: 43.6 % (ref 35–45)
HCV AB SERPL QL IA: NORMAL
HDLC SERPL-MCNC: 66 MG/DL
HGB BLD-MCNC: 14.3 G/DL (ref 11.7–15.5)
LDLC SERPL CALC-MCNC: 110 MG/DL (CALC)
MCH RBC QN AUTO: 31.7 PG (ref 27–33)
MCHC RBC AUTO-ENTMCNC: 32.8 G/DL (ref 32–36)
MCV RBC AUTO: 96.7 FL (ref 80–100)
NONHDLC SERPL-MCNC: 125 MG/DL (CALC)
PLATELET # BLD AUTO: 364 THOUSAND/UL (ref 140–400)
PMV BLD REES-ECKER: 9.8 FL (ref 7.5–12.5)
POTASSIUM SERPL-SCNC: 4.2 MMOL/L (ref 3.5–5.3)
PROT SERPL-MCNC: 7.2 G/DL (ref 6.1–8.1)
RBC # BLD AUTO: 4.51 MILLION/UL (ref 3.8–5.1)
SODIUM SERPL-SCNC: 139 MMOL/L (ref 135–146)
TRIGL SERPL-MCNC: 64 MG/DL
TSH SERPL-ACNC: 3.67 MIU/L (ref 0.4–4.5)
WBC # BLD AUTO: 6.2 THOUSAND/UL (ref 3.8–10.8)

## 2025-01-31 LAB — BACTERIA UR CULT: NORMAL

## 2025-02-03 ENCOUNTER — HOSPITAL ENCOUNTER (OUTPATIENT)
Dept: RADIOLOGY | Facility: HOSPITAL | Age: 65
Discharge: HOME | End: 2025-02-03
Payer: MEDICARE

## 2025-02-03 ENCOUNTER — HOSPITAL ENCOUNTER (OUTPATIENT)
Dept: RADIOLOGY | Facility: CLINIC | Age: 65
Discharge: HOME | End: 2025-02-03
Payer: MEDICARE

## 2025-02-03 DIAGNOSIS — Z78.0 POSTMENOPAUSE: ICD-10-CM

## 2025-02-03 DIAGNOSIS — S16.1XXD STRAIN OF NECK MUSCLE, SUBSEQUENT ENCOUNTER: ICD-10-CM

## 2025-02-03 DIAGNOSIS — Z72.89 CURRENT EVERY DAY VAPING: ICD-10-CM

## 2025-02-03 PROCEDURE — 72040 X-RAY EXAM NECK SPINE 2-3 VW: CPT

## 2025-02-03 PROCEDURE — 77080 DXA BONE DENSITY AXIAL: CPT | Performed by: RADIOLOGY

## 2025-02-03 PROCEDURE — 72040 X-RAY EXAM NECK SPINE 2-3 VW: CPT | Performed by: RADIOLOGY

## 2025-02-03 PROCEDURE — 77080 DXA BONE DENSITY AXIAL: CPT

## 2025-02-07 ENCOUNTER — TELEPHONE (OUTPATIENT)
Dept: PRIMARY CARE | Facility: CLINIC | Age: 65
End: 2025-02-07
Payer: COMMERCIAL

## 2025-02-07 DIAGNOSIS — M80.00XA AGE-RELATED OSTEOPOROSIS WITH CURRENT PATHOLOGICAL FRACTURE, INITIAL ENCOUNTER: Primary | ICD-10-CM

## 2025-02-07 NOTE — TELEPHONE ENCOUNTER
LVM re: osteoporosis noted on bone density scan.  Recommend vitamin D 2000 international units daily, calcium supplementation and weightbearing exercises.  Referral placed to rheumatology for further evaluation and discuss treatment management.

## 2025-02-18 ENCOUNTER — TELEPHONE (OUTPATIENT)
Dept: RHEUMATOLOGY | Facility: CLINIC | Age: 65
End: 2025-02-18
Payer: COMMERCIAL

## 2025-03-19 ENCOUNTER — TELEPHONE (OUTPATIENT)
Dept: PRIMARY CARE | Facility: CLINIC | Age: 65
End: 2025-03-19
Payer: COMMERCIAL

## 2025-03-19 DIAGNOSIS — J30.2 SEASONAL ALLERGIES: Primary | ICD-10-CM

## 2025-03-19 DIAGNOSIS — I10 BENIGN HYPERTENSION: ICD-10-CM

## 2025-03-19 DIAGNOSIS — K21.9 GERD WITHOUT ESOPHAGITIS: ICD-10-CM

## 2025-03-19 DIAGNOSIS — E03.8 OTHER SPECIFIED HYPOTHYROIDISM: ICD-10-CM

## 2025-03-23 RX ORDER — HYDROCHLOROTHIAZIDE 12.5 MG/1
12.5 TABLET ORAL DAILY
Qty: 90 TABLET | Refills: 0 | Status: SHIPPED | OUTPATIENT
Start: 2025-03-23

## 2025-03-23 RX ORDER — FAMOTIDINE 40 MG/1
40 TABLET, FILM COATED ORAL DAILY
Qty: 90 TABLET | Refills: 0 | Status: SHIPPED | OUTPATIENT
Start: 2025-03-23

## 2025-03-23 RX ORDER — LORATADINE 10 MG/1
10 TABLET ORAL DAILY PRN
Qty: 30 TABLET | Refills: 0 | Status: SHIPPED | OUTPATIENT
Start: 2025-03-23

## 2025-03-23 RX ORDER — AMLODIPINE BESYLATE 10 MG/1
10 TABLET ORAL DAILY
Qty: 90 TABLET | Refills: 0 | Status: SHIPPED | OUTPATIENT
Start: 2025-03-23

## 2025-03-23 RX ORDER — LEVOTHYROXINE SODIUM 112 UG/1
112 TABLET ORAL DAILY
Qty: 90 TABLET | Refills: 0 | Status: SHIPPED | OUTPATIENT
Start: 2025-03-23

## 2025-03-25 ENCOUNTER — HOSPITAL ENCOUNTER (OUTPATIENT)
Dept: RADIOLOGY | Facility: CLINIC | Age: 65
Discharge: HOME | End: 2025-03-25
Payer: MEDICARE

## 2025-03-25 VITALS — BODY MASS INDEX: 29.16 KG/M2 | HEIGHT: 65 IN | WEIGHT: 175 LBS

## 2025-03-25 DIAGNOSIS — E78.49 OTHER HYPERLIPIDEMIA: ICD-10-CM

## 2025-03-25 DIAGNOSIS — E03.8 OTHER SPECIFIED HYPOTHYROIDISM: ICD-10-CM

## 2025-03-25 DIAGNOSIS — I10 BENIGN HYPERTENSION: ICD-10-CM

## 2025-03-25 DIAGNOSIS — Z12.31 ENCOUNTER FOR SCREENING MAMMOGRAM FOR MALIGNANT NEOPLASM OF BREAST: ICD-10-CM

## 2025-03-25 DIAGNOSIS — I10 HYPERTENSION, BENIGN: ICD-10-CM

## 2025-03-25 PROCEDURE — 77063 BREAST TOMOSYNTHESIS BI: CPT

## 2025-03-25 PROCEDURE — 77067 SCR MAMMO BI INCL CAD: CPT | Performed by: RADIOLOGY

## 2025-03-25 PROCEDURE — 77063 BREAST TOMOSYNTHESIS BI: CPT | Performed by: RADIOLOGY

## 2025-03-25 NOTE — TELEPHONE ENCOUNTER
Needs 90 days on all medications sent to [facility]. Also can you send in a short supply of lisinopril to [facility].

## 2025-03-27 RX ORDER — LEVOTHYROXINE SODIUM 112 UG/1
112 TABLET ORAL DAILY
Qty: 90 TABLET | Refills: 0 | OUTPATIENT
Start: 2025-03-27

## 2025-03-27 RX ORDER — EZETIMIBE 10 MG/1
10 TABLET ORAL DAILY
Qty: 90 TABLET | Refills: 0 | OUTPATIENT
Start: 2025-03-27

## 2025-03-27 RX ORDER — HYDROCHLOROTHIAZIDE 12.5 MG/1
12.5 TABLET ORAL DAILY
Qty: 90 TABLET | Refills: 0 | OUTPATIENT
Start: 2025-03-27

## 2025-03-27 RX ORDER — AMLODIPINE BESYLATE 10 MG/1
10 TABLET ORAL DAILY
Qty: 90 TABLET | Refills: 0 | OUTPATIENT
Start: 2025-03-27

## 2025-03-27 RX ORDER — LISINOPRIL 20 MG/1
20 TABLET ORAL DAILY
Qty: 90 TABLET | Refills: 0 | OUTPATIENT
Start: 2025-03-27

## 2025-04-01 ENCOUNTER — OFFICE VISIT (OUTPATIENT)
Dept: PAIN MEDICINE | Facility: CLINIC | Age: 65
End: 2025-04-01
Payer: MEDICARE

## 2025-04-01 ENCOUNTER — APPOINTMENT (OUTPATIENT)
Dept: RHEUMATOLOGY | Facility: CLINIC | Age: 65
End: 2025-04-01
Payer: MEDICARE

## 2025-04-01 ENCOUNTER — APPOINTMENT (OUTPATIENT)
Dept: LAB | Facility: HOSPITAL | Age: 65
End: 2025-04-01
Payer: MEDICARE

## 2025-04-01 VITALS
BODY MASS INDEX: 29.32 KG/M2 | HEIGHT: 65 IN | HEART RATE: 79 BPM | RESPIRATION RATE: 15 BRPM | DIASTOLIC BLOOD PRESSURE: 85 MMHG | TEMPERATURE: 98.6 F | SYSTOLIC BLOOD PRESSURE: 127 MMHG | OXYGEN SATURATION: 98 % | WEIGHT: 176 LBS

## 2025-04-01 VITALS
DIASTOLIC BLOOD PRESSURE: 75 MMHG | HEIGHT: 65 IN | OXYGEN SATURATION: 96 % | WEIGHT: 176 LBS | SYSTOLIC BLOOD PRESSURE: 116 MMHG | HEART RATE: 73 BPM | BODY MASS INDEX: 29.32 KG/M2

## 2025-04-01 DIAGNOSIS — G89.29 CHRONIC RIGHT-SIDED LOW BACK PAIN WITH RIGHT-SIDED SCIATICA: ICD-10-CM

## 2025-04-01 DIAGNOSIS — M54.41 CHRONIC RIGHT-SIDED LOW BACK PAIN WITH RIGHT-SIDED SCIATICA: ICD-10-CM

## 2025-04-01 DIAGNOSIS — M80.00XA AGE-RELATED OSTEOPOROSIS WITH CURRENT PATHOLOGICAL FRACTURE, INITIAL ENCOUNTER: ICD-10-CM

## 2025-04-01 DIAGNOSIS — M15.0 PRIMARY OSTEOARTHRITIS INVOLVING MULTIPLE JOINTS: Primary | ICD-10-CM

## 2025-04-01 DIAGNOSIS — M51.26 LUMBAR DISC HERNIATION: Primary | ICD-10-CM

## 2025-04-01 DIAGNOSIS — M54.16 LUMBAR NEURITIS: ICD-10-CM

## 2025-04-01 LAB — PROT SERPL-MCNC: 7.5 G/DL (ref 6.4–8.2)

## 2025-04-01 PROCEDURE — 99204 OFFICE O/P NEW MOD 45 MIN: CPT | Performed by: INTERNAL MEDICINE

## 2025-04-01 PROCEDURE — 84165 PROTEIN E-PHORESIS SERUM: CPT

## 2025-04-01 PROCEDURE — 86334 IMMUNOFIX E-PHORESIS SERUM: CPT

## 2025-04-01 PROCEDURE — 1036F TOBACCO NON-USER: CPT | Performed by: INTERNAL MEDICINE

## 2025-04-01 PROCEDURE — 1160F RVW MEDS BY RX/DR IN RCRD: CPT | Performed by: INTERNAL MEDICINE

## 2025-04-01 PROCEDURE — 3008F BODY MASS INDEX DOCD: CPT | Performed by: INTERNAL MEDICINE

## 2025-04-01 PROCEDURE — 1159F MED LIST DOCD IN RCRD: CPT | Performed by: INTERNAL MEDICINE

## 2025-04-01 PROCEDURE — 86320 SERUM IMMUNOELECTROPHORESIS: CPT

## 2025-04-01 PROCEDURE — 99214 OFFICE O/P EST MOD 30 MIN: CPT | Performed by: ANESTHESIOLOGY

## 2025-04-01 PROCEDURE — 84155 ASSAY OF PROTEIN SERUM: CPT

## 2025-04-01 PROCEDURE — 3074F SYST BP LT 130 MM HG: CPT | Performed by: INTERNAL MEDICINE

## 2025-04-01 PROCEDURE — 3078F DIAST BP <80 MM HG: CPT | Performed by: INTERNAL MEDICINE

## 2025-04-01 PROCEDURE — 1125F AMNT PAIN NOTED PAIN PRSNT: CPT | Performed by: INTERNAL MEDICINE

## 2025-04-01 RX ORDER — ALENDRONATE SODIUM 70 MG/1
70 TABLET ORAL
Qty: 4 TABLET | Refills: 1 | Status: SHIPPED | OUTPATIENT
Start: 2025-04-01 | End: 2025-05-31

## 2025-04-01 RX ORDER — NAPROXEN 375 MG/1
375 TABLET ORAL
COMMUNITY

## 2025-04-01 ASSESSMENT — PATIENT HEALTH QUESTIONNAIRE - PHQ9
SUM OF ALL RESPONSES TO PHQ9 QUESTIONS 1 AND 2: 0
2. FEELING DOWN, DEPRESSED OR HOPELESS: NOT AT ALL
SUM OF ALL RESPONSES TO PHQ9 QUESTIONS 1 AND 2: 0
1. LITTLE INTEREST OR PLEASURE IN DOING THINGS: NOT AT ALL
1. LITTLE INTEREST OR PLEASURE IN DOING THINGS: NOT AT ALL
2. FEELING DOWN, DEPRESSED OR HOPELESS: NOT AT ALL

## 2025-04-01 ASSESSMENT — COLUMBIA-SUICIDE SEVERITY RATING SCALE - C-SSRS
6. HAVE YOU EVER DONE ANYTHING, STARTED TO DO ANYTHING, OR PREPARED TO DO ANYTHING TO END YOUR LIFE?: NO
2. HAVE YOU ACTUALLY HAD ANY THOUGHTS OF KILLING YOURSELF?: NO
1. IN THE PAST MONTH, HAVE YOU WISHED YOU WERE DEAD OR WISHED YOU COULD GO TO SLEEP AND NOT WAKE UP?: NO

## 2025-04-01 ASSESSMENT — ENCOUNTER SYMPTOMS
OCCASIONAL FEELINGS OF UNSTEADINESS: 1
LOSS OF SENSATION IN FEET: 0
OCCASIONAL FEELINGS OF UNSTEADINESS: 0
LOSS OF SENSATION IN FEET: 0
DEPRESSION: 0

## 2025-04-01 ASSESSMENT — PAIN DESCRIPTION - DESCRIPTORS: DESCRIPTORS: BURNING;SHARP

## 2025-04-01 ASSESSMENT — PAIN SCALES - GENERAL
PAINLEVEL_OUTOF10: 8

## 2025-04-01 ASSESSMENT — PAIN - FUNCTIONAL ASSESSMENT: PAIN_FUNCTIONAL_ASSESSMENT: 0-10

## 2025-04-01 NOTE — PROGRESS NOTES
Initial Rheumatology Patient Visit    Chief Complaint:  Mayra Ocasio is a 64 y.o. female presenting today for New Patient Visit.    History of Presenting Problem:   64 y.o.  female with DJD of various joint and osteoporosis present for evaluation. She denies family Hx or personal Hx of Fragility Fracture. She report hx of spinal stenosis with sciatica symptoms down her right lower extremity.  She currently takes vitamin D supplements and just recently started taking calcium supplements.  She has tried to take Aleve and Tylenol for her pain which helps somewhat but not as much her primary care has given her muscle relaxants for her lower back pain with sciatica.  She has been told she has arthritis in her hip and her knee and would likely need a  knee replacement.    Problem List:   Patient Active Problem List   Diagnosis    Asymptomatic microscopic hematuria    Other abnormal glucose    External hemorrhoids    Fatigue    Feeling sick    Gastroesophageal reflux disease    Hematuria    History of hypertension    History of substance dependence (Multi)    History of thyroid disorder    History of varicella    Hyperlipidemia    Hypertension, benign    Hypertrophy of both inferior nasal turbinates    Hypothyroidism    Left bundle branch block (LBBB) on electrocardiogram    Left bundle branch block (LBBB)    Arthralgia of hip    Left hip pain    Greater trochanteric bursitis    Left knee pain    Melanocytic nevi of trunk    Nasal drainage    Nasal drainage    Nasal septal deviation    Nonscarring hair loss, unspecified    Osteoarthritis of hip    Hemangioma of skin and subcutaneous tissue    Other benign neoplasm of skin, unspecified    Other specified follicular disorders    Other seborrheic keratosis    Other melanin hyperpigmentation    Overweight    Peripheral tear of lateral meniscus of left knee, initial encounter    Peripheral tear of medial meniscus of left knee, initial encounter    Carotid bruit    Right  carotid bruit    Right wrist pain    Sciatica of right side    Sciatica    Snoring    Disease suspected    Suspected sleep apnea    Upper respiratory tract infection    Vitamin D deficiency    Aortic atherosclerosis (CMS-HCC)    Arthritis of knee, left    Chronic rhinitis    Colon polyps    Colon, diverticulosis    Multiple pulmonary nodules       Past Medical History:   Past Medical History:   Diagnosis Date    Cramp and spasm 01/24/2020    Bilateral leg cramps    Encounter for gynecological examination (general) (routine) without abnormal findings     Pap smear, as part of routine gynecological examination    Encounter for gynecological examination (general) (routine) without abnormal findings 12/20/2017    Well female exam with routine gynecological exam    Hypothyroidism, unspecified 11/03/2021    Hypothyroidism    Localized swelling, mass and lump, unspecified upper limb     Lump of axilla    Osteoarthritis of hip, unspecified     Osteoarthritis of hip    Other allergy status, other than to drugs and biological substances     History of environmental allergies    Personal history of nicotine dependence 12/03/2014    History of nicotine dependence    Personal history of other diseases of the circulatory system     History of cardiac disorder    Personal history of other diseases of the circulatory system     History of hypertension    Personal history of other diseases of the respiratory system     History of sinus problem    Personal history of other diseases of the respiratory system     History of chronic obstructive lung disease    Personal history of other endocrine, nutritional and metabolic disease     History of thyroid disorder    Personal history of other infectious and parasitic diseases     History of varicella       Surgical History:   Past Surgical History:   Procedure Laterality Date    COLONOSCOPY  11/21/2014    Complete Colonoscopy    CT ABDOMEN PELVIS ANGIOGRAM W AND/OR WO IV CONTRAST   12/1/2018    CT ABDOMEN PELVIS ANGIOGRAM W AND/OR WO IV CONTRAST 12/1/2018 [facility]    OTHER SURGICAL HISTORY  08/21/2015    History Of Prior Surgery    OTHER SURGICAL HISTORY  08/21/2015    History Of Prior Surgery        Allergies: No Known Allergies    Medications:   Current Outpatient Medications:     amLODIPine (Norvasc) 10 mg tablet, Take 1 tablet (10 mg) by mouth once daily., Disp: 90 tablet, Rfl: 0    ergocalciferol (Vitamin D-2) 1.25 MG (67372 UT) capsule, Take by mouth., Disp: , Rfl:     ezetimibe (Zetia) 10 mg tablet, TAKE 1 TABLET DAILY, Disp: 90 tablet, Rfl: 0    famotidine (Pepcid) 40 mg tablet, Take 1 tablet (40 mg) by mouth once daily., Disp: 90 tablet, Rfl: 0    hydroCHLOROthiazide (Microzide) 12.5 mg tablet, Take 1 tablet (12.5 mg) by mouth once daily., Disp: 90 tablet, Rfl: 0    levothyroxine (Synthroid, Levoxyl) 112 mcg tablet, Take 1 tablet (112 mcg) by mouth once daily., Disp: 90 tablet, Rfl: 0    lisinopril 20 mg tablet, TAKE 1 TABLET ONCE DAILY, Disp: 90 tablet, Rfl: 0    loratadine (Claritin) 10 mg tablet, Take 1 tablet (10 mg) by mouth once daily as needed for allergies., Disp: 30 tablet, Rfl: 0    multivit with calcium,iron,min (MULTIPLE VITAMIN, WOMENS ORAL), Take by mouth., Disp: , Rfl:     alendronate (Fosamax) 70 mg tablet, Take 1 tablet (70 mg) by mouth every 7 days. Take in morning with full glass of water on an empty stomach. No food, drink, or meds for 30 minutes after. Do not lie down until after first meal of day (to avoid throat irritation)., Disp: 4 tablet, Rfl: 1    Review of Systems:   ROS: Denies Morning stiffness, joint pain and swelling, dry eyes and mouth, Hx of chronic sinusitis, Hx of ear inflammation, oral, nasal or genital ulcers, Denies sun sensitivity, Raynaud's phenomenon. Denies Uveitis or recent vision changes. Denies new rashes or Hx of Psoriasis, Denies alopecia,   Denies Chest pain/SOB, cough, Hx of asthma, Denies Fever/chills/sweats, Denies  "Fatigue, weight loss  Denies abdominal pain, New onset Dyspepsia, dysphasia, nausea/vomiting/diarrhea, dark/tarry stools, blood in stools or urine. Denies Chronic back pain.   Denies Hx of blood clot or miscarriages. Hx of easy bruising or bleeding. Denies Headaches, numbness and tingling, weakness.  All other systems have been reviewed and are negative for complaint.     Objective   Physical Examination:    Visit Vitals  /75   Pulse 73   Ht 1.651 m (5' 5\")   Wt 79.8 kg (176 lb)   SpO2 96%   BMI 29.29 kg/m²   OB Status Postmenopausal   Smoking Status Former   BSA 1.91 m²        Gen: NAD, A&O x 3  HEENT: clear sclera and conjunctiva,     Musculoskeletal:   Neck; WNL, full ROM  Shoulder: WNL, full ROM  Elbow:WNL, full ROM, no effusion noted  Wrist and fingers;no active synovitis noted, Full ROM in the Wrist , Good fist and   Knees:  No effusions or crepitation, full ROM.  Hips; WNL, full ROM, Negative Hugo test  Ankle, Feet; WNL, full ROM    Skin: No rashes or lesions seen, no nail changes  Neuro: A&O x3, Normal Gait    Procedures :None    Orders:  Orders Placed This Encounter   Procedures    Parathyroid Hormone, Intact    Vitamin D 25-Hydroxy,Total (for eval of Vitamin D levels)    Serum Protein Electrophoresis + Immunofixation    Rheumatoid Factor    Citrulline Antibody, IgG    Uric Acid        Provider Impression:   Assessment/Plan   Encounter Diagnoses   Name Primary?    Age-related osteoporosis with current pathological fracture, initial encounter     Primary osteoarthritis involving multiple joints Yes        64 y.o.  female with DJD of various joint and osteoporosis present for evaluation.   -After discussing with patient and weighing her risk factors the first indicated therapy would be oral bisphosphonates, will see if she can tolerate this.  Start Fosamax 70 mg weekly recent side effect of treatment was discussed.  -The patient should maintain intake of supplementation of calcium 1200 mg in " divided doses and vitamin D 2000 units,  -We will check vitamin D level, TSH PTH and protein electrophoresis  -Avoid activity that puts her at risk for fracture  -Engagement in regular weight bearing and muscle strengthening exercises as tolerated.   - Excessive caffeine, Tobacco use and alcohol use should be avoided.  -Patient should have a repeat BMD testing in February 2027  -Recommend she take Aleve 1 tablet twice a day as needed for pain she can also take Tylenol 1000 mg with this regimen  -Follow-up in 2 months

## 2025-04-01 NOTE — PROGRESS NOTES
History Of Present Illness  Mayra Ocasio is a 64 y.o. female presenting with   Chief Complaint   Patient presents with    Back Pain       Patient presents with complaints of chronic right hip and leg pain that radiates down his RLE. The pain is constant, worse with standing/prolonged sitting and better with rest. The pain is sharp and burning and shooting to the RLE. Denies LE paresthesias, weakness, saddle anesthesia, bowel or bladder incontinence. To manage this pain the patient has attempted ICE, PT, Voltaren gel, Asper cream, Aleve, and Tylenol. The patients chronic HTN, DLD, Hypothyroidism, GERD are stable on medication management.     PAIN SCORE: 8/10.    Ortho: Dr. Grey    PCP: Ana Lilia Rodriguez    Past Medical History  She has a past medical history of Cramp and spasm (01/24/2020), Encounter for gynecological examination (general) (routine) without abnormal findings, Encounter for gynecological examination (general) (routine) without abnormal findings (12/20/2017), Hypothyroidism, unspecified (11/03/2021), Localized swelling, mass and lump, unspecified upper limb, Osteoarthritis of hip, unspecified, Other allergy status, other than to drugs and biological substances, Personal history of nicotine dependence (12/03/2014), Personal history of other diseases of the circulatory system, Personal history of other diseases of the circulatory system, Personal history of other diseases of the respiratory system, Personal history of other diseases of the respiratory system, Personal history of other endocrine, nutritional and metabolic disease, and Personal history of other infectious and parasitic diseases.    Surgical History  She has a past surgical history that includes Colonoscopy (11/21/2014); Other surgical history (08/21/2015); Other surgical history (08/21/2015); and CT angio abdomen pelvis w and or wo IV IV contrast (12/1/2018).     Social History  She reports that she quit smoking about 10 years ago. Her  smoking use included cigarettes. She started smoking about 25 years ago. She has a 15 pack-year smoking history. She has never used smokeless tobacco. She reports current alcohol use. She reports that she does not use drugs.    Family History  Family History   Problem Relation Name Age of Onset    Diabetes Mother      Arthritis Mother      Hypertension Mother      Hypertension Father      Breast cancer Mother's Sister          Allergies  Patient has no known allergies.    Review of Systems    All other systems reviewed and negative for any deficits. Pertinent positives and negatives were considered in the medical decision making process.        Physical Exam  /85   Pulse 79   Temp 37 °C (98.6 °F)   Resp 15   Wt 79.8 kg (176 lb)   SpO2 98%     General: Pt appears stated age    Eyes: Conjunctiva non-icteric and lids without obvious rash or drooping. Pupils are symmetric.    ENT: External ears and nose appear to be without deformity or rash. No lesions or masses noted. Hearing is grossly intact.    Respiratory: No gasping or shortness of breath noted. No use of accessory muscles noted.    CVS: Extremities show no edema or varicosities    Skin: No rashes or open lesions/ulcers identified on skin. No induration/tightening noted with palpation of skin.     Musculoskeletal: [place] is grossly normal.    Stability: No subluxation noted on movement of bilateral upper extremities or head/neck.     Strength: 4/5 in RLE and 5/5 in LLE     Range of Motion: WNL    Neurologic: Reflexes 2+    Sensation: WNL    Neurologic: Cranial Nerves II thru XII are grossly intact    Psychiatric: Pt is alert and oriented to time, person, and place           Assessment/Plan   1. Lumbar disc herniation        2. Lumbar neuritis        3. Chronic right-sided low back pain with right-sided sciatica               I have provided the patient with a list of physical therapy exercises to learn and perform to strengthen core, maintain  stabilization, and reduce pain. We reviewed the exercises in detail and I encouraged them to perform them on a regular basis. The pt completed 6 weeks of PT at [facility].   I would recommend the pt start on Gabapentin to help with nerve related pain. We discussed the risks, benefits, and side effects to this medication including the mechanism of action and the pt understands and agrees.  I extensively reviewed the patients MRI findings ([facility]) in detail, including review of the actual images and provided a detailed explanation of the findings using a spine model.     [facility]    4. The patient is a candidate for an LESI L5/S1 vs RIGHT L4 AND L5 to treat low back and radicular pain. I spent time with the patient discussing the risks, benefits, and alternatives to this measure including, but not limited to worsening pain with injection, no improvement/guarantee with the procedure, numbness in the lower extremity and risk of falls post procedure, vagal reaction/ hypotension, feeling dizzy / lightheaded, spinal infection, worsening pre-existing infections, epidural hematoma, epidural abscess, nerve injury, paralysis, spinal fluid leak and spinal headache. The patient understands all of these risks and agrees to proceed with the planned procedure.       I spent time with the patient reviewing their imaging and discussing the risks benefits and alternatives to the above plan. A total of 45 minutes was spent reviewing the data and greater than 50% of that time was with the patient during the face to face encounter discussing treatment options both surgical, non-surgical, and minimally invasive techniques.         Sincerely,     Fito Kim MD, EVANGELISTAA  Board Certified Anesthesiologist  Board Certified Pain Management Specialist  Clinical Faculty, Department of Anesthesiology and Perioperative Medicine  [employer] [employer]     [facility]  [address]  [employer] [facility]  [address]  [place] [address]     [facility]   [address] [place] [address]    [facility]  [address] [place] [address]     Phone: [phone]  Fax: [phone]

## 2025-04-03 LAB
25(OH)D3+25(OH)D2 SERPL-MCNC: 41 NG/ML (ref 30–100)
CCP IGG SERPL-ACNC: <16 UNITS
PTH-INTACT SERPL-MCNC: 36 PG/ML (ref 16–77)
RHEUMATOID FACT SERPL-ACNC: <10 IU/ML
URATE SERPL-MCNC: 5 MG/DL (ref 2.5–7)

## 2025-04-05 LAB
ALBUMIN: 4.5 G/DL (ref 3.4–5)
ALPHA 1 GLOBULIN: 0.4 G/DL (ref 0.2–0.6)
ALPHA 2 GLOBULIN: 0.7 G/DL (ref 0.4–1.1)
BETA GLOBULIN: 1 G/DL (ref 0.5–1.2)
GAMMA GLOBULIN: 0.9 G/DL (ref 0.5–1.4)
IMMUNOFIXATION COMMENT: NORMAL
PATH REVIEW - SERUM IMMUNOFIXATION: NORMAL
PATH REVIEW-SERUM PROTEIN ELECTROPHORESIS: NORMAL
PROTEIN ELECTROPHORESIS COMMENT: NORMAL

## 2025-04-14 ENCOUNTER — TELEPHONE (OUTPATIENT)
Dept: PAIN MEDICINE | Facility: CLINIC | Age: 65
End: 2025-04-14
Payer: MEDICARE

## 2025-04-16 DIAGNOSIS — M79.18 MYOFASCIAL PAIN: Primary | ICD-10-CM

## 2025-04-16 RX ORDER — CYCLOBENZAPRINE HCL 5 MG
5 TABLET ORAL 2 TIMES DAILY PRN
Qty: 30 TABLET | Refills: 2 | Status: SHIPPED | OUTPATIENT
Start: 2025-04-16 | End: 2025-04-18

## 2025-04-18 DIAGNOSIS — S16.1XXD STRAIN OF NECK MUSCLE, SUBSEQUENT ENCOUNTER: ICD-10-CM

## 2025-04-18 RX ORDER — CYCLOBENZAPRINE HCL 5 MG
5 TABLET ORAL NIGHTLY PRN
Qty: 30 TABLET | Refills: 3 | Status: SHIPPED | OUTPATIENT
Start: 2025-04-18 | End: 2025-06-17

## 2025-04-30 ENCOUNTER — TELEPHONE (OUTPATIENT)
Dept: PRIMARY CARE | Facility: CLINIC | Age: 65
End: 2025-04-30
Payer: MEDICARE

## 2025-04-30 DIAGNOSIS — I10 HYPERTENSION, BENIGN: ICD-10-CM

## 2025-04-30 DIAGNOSIS — E03.8 OTHER SPECIFIED HYPOTHYROIDISM: ICD-10-CM

## 2025-04-30 DIAGNOSIS — I10 BENIGN HYPERTENSION: ICD-10-CM

## 2025-04-30 DIAGNOSIS — K21.9 GERD WITHOUT ESOPHAGITIS: ICD-10-CM

## 2025-04-30 DIAGNOSIS — E78.49 OTHER HYPERLIPIDEMIA: ICD-10-CM

## 2025-04-30 RX ORDER — LEVOTHYROXINE SODIUM 112 UG/1
112 TABLET ORAL DAILY
Qty: 90 TABLET | Refills: 0 | Status: SHIPPED | OUTPATIENT
Start: 2025-04-30

## 2025-04-30 RX ORDER — HYDROCHLOROTHIAZIDE 12.5 MG/1
12.5 TABLET ORAL DAILY
Qty: 90 TABLET | Refills: 0 | Status: SHIPPED | OUTPATIENT
Start: 2025-04-30

## 2025-04-30 RX ORDER — LISINOPRIL 20 MG/1
20 TABLET ORAL DAILY
Qty: 90 TABLET | Refills: 0 | Status: SHIPPED | OUTPATIENT
Start: 2025-04-30

## 2025-04-30 RX ORDER — EZETIMIBE 10 MG/1
10 TABLET ORAL DAILY
Qty: 90 TABLET | Refills: 0 | Status: SHIPPED | OUTPATIENT
Start: 2025-04-30

## 2025-04-30 RX ORDER — AMLODIPINE BESYLATE 10 MG/1
10 TABLET ORAL DAILY
Qty: 90 TABLET | Refills: 0 | Status: SHIPPED | OUTPATIENT
Start: 2025-04-30

## 2025-04-30 RX ORDER — FAMOTIDINE 40 MG/1
40 TABLET, FILM COATED ORAL DAILY
Qty: 90 TABLET | Refills: 0 | Status: SHIPPED | OUTPATIENT
Start: 2025-04-30

## 2025-05-05 ENCOUNTER — TELEPHONE (OUTPATIENT)
Dept: PAIN MEDICINE | Facility: CLINIC | Age: 65
End: 2025-05-05
Payer: MEDICARE

## 2025-05-05 NOTE — TELEPHONE ENCOUNTER
Pt requesting lidocaine 5% patches and prednisone to help the inflammation of the sciatica. Moved her fuv to 05/12.

## 2025-05-08 DIAGNOSIS — M25.552 LEFT HIP PAIN: Primary | ICD-10-CM

## 2025-05-08 DIAGNOSIS — M25.552 PAIN OF LEFT HIP JOINT: ICD-10-CM

## 2025-05-08 RX ORDER — LIDOCAINE 50 MG/G
1 PATCH TOPICAL DAILY
Qty: 30 PATCH | Refills: 2 | Status: SHIPPED | OUTPATIENT
Start: 2025-05-08

## 2025-05-12 ENCOUNTER — OFFICE VISIT (OUTPATIENT)
Dept: PAIN MEDICINE | Facility: CLINIC | Age: 65
End: 2025-05-12
Payer: MEDICARE

## 2025-05-12 VITALS
HEIGHT: 65 IN | HEART RATE: 84 BPM | SYSTOLIC BLOOD PRESSURE: 143 MMHG | WEIGHT: 176 LBS | OXYGEN SATURATION: 79 % | BODY MASS INDEX: 29.32 KG/M2 | TEMPERATURE: 98.2 F | RESPIRATION RATE: 15 BRPM | DIASTOLIC BLOOD PRESSURE: 88 MMHG

## 2025-05-12 DIAGNOSIS — M54.16 LUMBAR NEURITIS: Primary | ICD-10-CM

## 2025-05-12 DIAGNOSIS — M51.26 LUMBAR DISC HERNIATION: ICD-10-CM

## 2025-05-12 DIAGNOSIS — M48.061 LUMBAR FORAMINAL STENOSIS: ICD-10-CM

## 2025-05-12 DIAGNOSIS — M47.816 LUMBAR SPONDYLOSIS: ICD-10-CM

## 2025-05-12 PROCEDURE — 99214 OFFICE O/P EST MOD 30 MIN: CPT | Performed by: ANESTHESIOLOGY

## 2025-05-12 RX ORDER — GABAPENTIN 300 MG/1
300 CAPSULE ORAL NIGHTLY
Qty: 30 CAPSULE | Refills: 11 | Status: SHIPPED | OUTPATIENT
Start: 2025-05-12 | End: 2025-06-11

## 2025-05-12 ASSESSMENT — PAIN - FUNCTIONAL ASSESSMENT: PAIN_FUNCTIONAL_ASSESSMENT: 0-10

## 2025-05-12 ASSESSMENT — ENCOUNTER SYMPTOMS
LOSS OF SENSATION IN FEET: 0
OCCASIONAL FEELINGS OF UNSTEADINESS: 1

## 2025-05-12 ASSESSMENT — PAIN SCALES - GENERAL
PAINLEVEL_OUTOF10: 10-WORST PAIN EVER
PAINLEVEL_OUTOF10: 10 - WORST POSSIBLE PAIN

## 2025-05-12 ASSESSMENT — PAIN DESCRIPTION - DESCRIPTORS: DESCRIPTORS: THROBBING

## 2025-05-12 NOTE — PROGRESS NOTES
History Of Present Illness  Mayra Ocasio is a 64 y.o. female presenting with   Chief Complaint   Patient presents with    Follow-up       Patient follows up regarding chronic right hip and leg pain that radiates down his RLE. The pain is constant, worse with standing/prolonged sitting and better with rest. The pain is sharp and burning and shooting to the RLE. Denies LE paresthesias, weakness, saddle anesthesia, bowel or bladder incontinence. To manage this pain the patient has attempted ICE, PT, Voltaren gel, Asper cream, Aleve, and Tylenol. The patients chronic HTN, DLD, Hypothyroidism, GERD are stable on medication management.     PAIN SCORE: 8/10.    Ortho: Dr. Grey    PCP: Ana Lilia Rodriguez    Past Medical History  She has a past medical history of Cramp and spasm (01/24/2020), Encounter for gynecological examination (general) (routine) without abnormal findings, Encounter for gynecological examination (general) (routine) without abnormal findings (12/20/2017), Hypothyroidism, unspecified (11/03/2021), Localized swelling, mass and lump, unspecified upper limb, Osteoarthritis of hip, unspecified, Other allergy status, other than to drugs and biological substances, Personal history of nicotine dependence (12/03/2014), Personal history of other diseases of the circulatory system, Personal history of other diseases of the circulatory system, Personal history of other diseases of the respiratory system, Personal history of other diseases of the respiratory system, Personal history of other endocrine, nutritional and metabolic disease, and Personal history of other infectious and parasitic diseases.    Surgical History  She has a past surgical history that includes Colonoscopy (11/21/2014); Other surgical history (08/21/2015); Other surgical history (08/21/2015); and CT angio abdomen pelvis w and or wo IV IV contrast (12/1/2018).     Social History  She reports that she quit smoking about 10 years ago. Her smoking  use included cigarettes. She started smoking about 25 years ago. She has a 15 pack-year smoking history. She has never used smokeless tobacco. She reports current alcohol use. She reports that she does not use drugs.    Family History  Family History   Problem Relation Name Age of Onset    Diabetes Mother      Arthritis Mother      Hypertension Mother      Hypertension Father      Breast cancer Mother's Sister          Allergies  Patient has no known allergies.    Review of Systems    All other systems reviewed and negative for any deficits. Pertinent positives and negatives were considered in the medical decision making process.        Physical Exam  /88   Pulse 84   Temp 36.8 °C (98.2 °F)   Resp 15   Wt 79.8 kg (176 lb)   SpO2 (!) 79%     General: Pt appears stated age    Eyes: Conjunctiva non-icteric and lids without obvious rash or drooping. Pupils are symmetric.    ENT: External ears and nose appear to be without deformity or rash. No lesions or masses noted. Hearing is grossly intact.    Respiratory: No gasping or shortness of breath noted. No use of accessory muscles noted.    CVS: Extremities show no edema or varicosities    Skin: No rashes or open lesions/ulcers identified on skin. No induration/tightening noted with palpation of skin.     Musculoskeletal: [place] is grossly normal.    Stability: No subluxation noted on movement of bilateral upper extremities or head/neck.     Strength: 4/5 in RLE and 5/5 in LLE     Range of Motion: WNL    Neurologic: Reflexes 2+    Sensation: WNL    Neurologic: Cranial Nerves II thru XII are grossly intact    Psychiatric: Pt is alert and oriented to time, person, and place           Assessment/Plan   1. Lumbar neuritis        2. Lumbar disc herniation        3. Lumbar foraminal stenosis        4. Lumbar spondylosis             I have provided the patient with a list of physical therapy exercises to learn and perform to strengthen core, maintain stabilization, and  reduce pain. We reviewed the exercises in detail and I encouraged them to perform them on a regular basis. The pt completed 6 weeks of PT at [facility].   I would recommend the pt start on Gabapentin to help with nerve related pain. We discussed the risks, benefits, and side effects to this medication including the mechanism of action and the pt understands and agrees.  I extensively reviewed the patients MRI findings ([facility] 2025) in detail, including review of the actual images and provided a detailed explanation of the findings using a spine model.     [facility]  [phone]    4. The patient is a candidate for an LESI L5/S1 vs RIGHT L4 AND L5 to treat low back and radicular pain. I spent time with the patient discussing the risks, benefits, and alternatives to this measure including, but not limited to worsening pain with injection, no improvement/guarantee with the procedure, numbness in the lower extremity and risk of falls post procedure, vagal reaction/ hypotension, feeling dizzy / lightheaded, spinal infection, worsening pre-existing infections, epidural hematoma, epidural abscess, nerve injury, paralysis, spinal fluid leak and spinal headache. The patient understands all of these risks and agrees to proceed with the planned procedure.     Her last [place] [facility] and she reported 50% relief of her pain that lasted for approx 1 months time. She was able to stand and walk with much less pain, however, it has since slowly returned and limits her walking.       I spent time with the patient reviewing their imaging and discussing the risks benefits and alternatives to the above plan. A total of 30 minutes was spent reviewing the data and greater than 50% of that time was with the patient during the face to face encounter discussing treatment options both surgical, non-surgical, and minimally invasive techniques.       Sincerely,       Fito Kim MD, DENISHA  Board Certified Anesthesiologist  Board Certified Pain    Clinical Faculty, Department of Anesthesiology and Perioperative Medicine  [facility] [employer]     [facility]  [address].  [facility] [address]  [place] [id number]     [facility]   [address] [address]  [address] [place] [id number]     Phone: [phone]  Fax: [phone]

## 2025-06-02 ENCOUNTER — APPOINTMENT (OUTPATIENT)
Dept: PAIN MEDICINE | Facility: CLINIC | Age: 65
End: 2025-06-02
Payer: MEDICARE

## 2025-06-09 DIAGNOSIS — M80.00XA AGE-RELATED OSTEOPOROSIS WITH CURRENT PATHOLOGICAL FRACTURE, INITIAL ENCOUNTER: ICD-10-CM

## 2025-06-09 RX ORDER — ALENDRONATE SODIUM 70 MG/1
70 TABLET ORAL
Qty: 4 TABLET | Refills: 0 | Status: SHIPPED | OUTPATIENT
Start: 2025-06-09 | End: 2025-07-08

## 2025-07-02 DIAGNOSIS — I10 HYPERTENSION, BENIGN: ICD-10-CM

## 2025-07-02 RX ORDER — LISINOPRIL 20 MG/1
20 TABLET ORAL DAILY
Qty: 90 TABLET | Refills: 0 | Status: SHIPPED | OUTPATIENT
Start: 2025-07-02

## 2025-07-08 ENCOUNTER — TELEPHONE (OUTPATIENT)
Dept: RHEUMATOLOGY | Facility: CLINIC | Age: 65
End: 2025-07-08
Payer: MEDICARE

## 2025-07-08 DIAGNOSIS — M80.00XA AGE-RELATED OSTEOPOROSIS WITH CURRENT PATHOLOGICAL FRACTURE, INITIAL ENCOUNTER: ICD-10-CM

## 2025-07-08 RX ORDER — ALENDRONATE SODIUM 70 MG/1
70 TABLET ORAL
Qty: 4 TABLET | Refills: 0 | Status: SHIPPED | OUTPATIENT
Start: 2025-07-08 | End: 2025-08-06

## 2025-07-08 NOTE — TELEPHONE ENCOUNTER
Prescription Request           Has The Patient Been Identified By Name And Date Of Birth: Yes     RX Requestor: Patient     Date of Last Refill: 06/09/2025     Date Of Last Office Visit: 04/01/2025     Date Of Future Office Visit: 07/14/2025    Labs Drawn 04/01/2025

## 2025-07-14 ENCOUNTER — APPOINTMENT (OUTPATIENT)
Dept: RHEUMATOLOGY | Facility: CLINIC | Age: 65
End: 2025-07-14
Payer: MEDICARE

## 2025-07-14 VITALS
WEIGHT: 181.4 LBS | OXYGEN SATURATION: 96 % | BODY MASS INDEX: 30.19 KG/M2 | DIASTOLIC BLOOD PRESSURE: 71 MMHG | SYSTOLIC BLOOD PRESSURE: 119 MMHG | HEART RATE: 69 BPM

## 2025-07-14 DIAGNOSIS — M80.00XA AGE-RELATED OSTEOPOROSIS WITH CURRENT PATHOLOGICAL FRACTURE, INITIAL ENCOUNTER: Primary | ICD-10-CM

## 2025-07-14 PROCEDURE — 1036F TOBACCO NON-USER: CPT | Performed by: INTERNAL MEDICINE

## 2025-07-14 PROCEDURE — 3078F DIAST BP <80 MM HG: CPT | Performed by: INTERNAL MEDICINE

## 2025-07-14 PROCEDURE — 1159F MED LIST DOCD IN RCRD: CPT | Performed by: INTERNAL MEDICINE

## 2025-07-14 PROCEDURE — 99213 OFFICE O/P EST LOW 20 MIN: CPT | Performed by: INTERNAL MEDICINE

## 2025-07-14 PROCEDURE — 3074F SYST BP LT 130 MM HG: CPT | Performed by: INTERNAL MEDICINE

## 2025-07-14 PROCEDURE — 1160F RVW MEDS BY RX/DR IN RCRD: CPT | Performed by: INTERNAL MEDICINE

## 2025-07-14 PROCEDURE — 1125F AMNT PAIN NOTED PAIN PRSNT: CPT | Performed by: INTERNAL MEDICINE

## 2025-07-14 RX ORDER — ALENDRONATE SODIUM 70 MG/1
70 TABLET ORAL
Qty: 12 TABLET | Refills: 1 | Status: SHIPPED | OUTPATIENT
Start: 2025-07-14 | End: 2025-08-12

## 2025-07-14 RX ORDER — ACETAMINOPHEN 500 MG
1000 TABLET ORAL EVERY 6 HOURS PRN
COMMUNITY

## 2025-07-14 ASSESSMENT — PAIN SCALES - GENERAL: PAINLEVEL_OUTOF10: 7

## 2025-07-14 NOTE — PROGRESS NOTES
Follow up Rheumatology Patient Visit    Chief Complaint:  Mayra Ocasio is a 64 y.o. female presenting today for Follow-up.    History of Presenting Problem:   64 y.o.  female with DJD of various joint and osteoporosis present for evaluation. She denies family Hx or personal Hx of Fragility Fracture. She report hx of spinal stenosis with sciatica symptoms down her right lower extremity.  She currently takes vitamin D supplements and just recently started taking calcium supplements.  Today she reports she is tolerating alendronate without any adverse effects    Problem List:   Patient Active Problem List   Diagnosis    Asymptomatic microscopic hematuria    Other abnormal glucose    External hemorrhoids    Fatigue    Feeling sick    Gastroesophageal reflux disease    Hematuria    History of hypertension    History of substance dependence (Multi)    History of thyroid disorder    History of varicella    Hyperlipidemia    Hypertension, benign    Hypertrophy of both inferior nasal turbinates    Hypothyroidism    Left bundle branch block (LBBB) on electrocardiogram    Left bundle branch block (LBBB)    Arthralgia of hip    Left hip pain    Greater trochanteric bursitis    Left knee pain    Melanocytic nevi of trunk    Nasal drainage    Nasal drainage    Nasal septal deviation    Nonscarring hair loss, unspecified    Osteoarthritis of hip    Hemangioma of skin and subcutaneous tissue    Other benign neoplasm of skin, unspecified    Other specified follicular disorders    Other seborrheic keratosis    Other melanin hyperpigmentation    Overweight    Peripheral tear of lateral meniscus of left knee, initial encounter    Peripheral tear of medial meniscus of left knee, initial encounter    Carotid bruit    Right carotid bruit    Right wrist pain    Sciatica of right side    Sciatica    Snoring    Disease suspected    Suspected sleep apnea    Upper respiratory tract infection    Vitamin D deficiency    Aortic atherosclerosis     Arthritis of knee, left    Chronic rhinitis    Colon polyps    Colon, diverticulosis    Multiple pulmonary nodules       Past Medical History:   Past Medical History:   Diagnosis Date    Cramp and spasm 01/24/2020    Bilateral leg cramps    Encounter for gynecological examination (general) (routine) without abnormal findings     Pap smear, as part of routine gynecological examination    Encounter for gynecological examination (general) (routine) without abnormal findings 12/20/2017    Well female exam with routine gynecological exam    Hypothyroidism, unspecified 11/03/2021    Hypothyroidism    Localized swelling, mass and lump, unspecified upper limb     Lump of axilla    Osteoarthritis of hip, unspecified     Osteoarthritis of hip    Other allergy status, other than to drugs and biological substances     History of environmental allergies    Personal history of nicotine dependence 12/03/2014    History of nicotine dependence    Personal history of other diseases of the circulatory system     History of cardiac disorder    Personal history of other diseases of the circulatory system     History of hypertension    Personal history of other diseases of the respiratory system     History of sinus problem    Personal history of other diseases of the respiratory system     History of chronic obstructive lung disease    Personal history of other endocrine, nutritional and metabolic disease     History of thyroid disorder    Personal history of other infectious and parasitic diseases     History of varicella       Surgical History:   Past Surgical History:   Procedure Laterality Date    COLONOSCOPY  11/21/2014    Complete Colonoscopy    CT ABDOMEN PELVIS ANGIOGRAM W AND/OR WO IV CONTRAST  12/1/2018    CT ABDOMEN PELVIS ANGIOGRAM W AND/OR WO IV CONTRAST 12/1/2018 [facility]    OTHER SURGICAL HISTORY  08/21/2015    History Of Prior Surgery    OTHER SURGICAL HISTORY  08/21/2015    History Of Prior Surgery         Allergies: No Known Allergies    Medications:   Current Outpatient Medications:     amLODIPine (Norvasc) 10 mg tablet, TAKE 1 TABLET DAILY, Disp: 90 tablet, Rfl: 0    ergocalciferol (Vitamin D-2) 1.25 MG (87067 UT) capsule, Take by mouth., Disp: , Rfl:     ezetimibe (Zetia) 10 mg tablet, TAKE 1 TABLET DAILY, Disp: 90 tablet, Rfl: 0    famotidine (Pepcid) 40 mg tablet, TAKE 1 TABLET DAILY, Disp: 90 tablet, Rfl: 0    hydroCHLOROthiazide (Microzide) 12.5 mg tablet, TAKE 1 TABLET ONCE DAILY, Disp: 90 tablet, Rfl: 0    levothyroxine (Synthroid, Levoxyl) 112 mcg tablet, TAKE 1 TABLET ONCE DAILY, Disp: 90 tablet, Rfl: 0    lidocaine (Lidoderm) 5 % patch, Place 1 patch over 12 hours on the skin once daily. Remove & discard patch within 12 hours or as directed by MD., Disp: 30 patch, Rfl: 2    lisinopril 20 mg tablet, Take 1 tablet (20 mg) by mouth once daily., Disp: 90 tablet, Rfl: 0    loratadine (Claritin) 10 mg tablet, Take 1 tablet (10 mg) by mouth once daily as needed for allergies., Disp: 30 tablet, Rfl: 0    multivit with calcium,iron,min (MULTIPLE VITAMIN, WOMENS ORAL), Take by mouth., Disp: , Rfl:     naproxen (Naprosyn) 375 mg tablet, Take 1 tablet (375 mg) by mouth 2 times daily (morning and late afternoon)., Disp: , Rfl:     acetaminophen (Tylenol) 500 mg tablet, Take 2 tablets (1,000 mg) by mouth every 6 hours if needed for mild pain (1 - 3)., Disp: , Rfl:     alendronate (Fosamax) 70 mg tablet, Take 1 tablet (70 mg) by mouth every 7 days. Take in morning with full glass of water on an empty stomach. No food, drink, or meds for 30 minutes after. Do not lie down until after first meal of day (to avoid throat irritation)., Disp: 12 tablet, Rfl: 1    gabapentin (Neurontin) 300 mg capsule, Take 1 capsule (300 mg) by mouth once daily at bedtime., Disp: 30 capsule, Rfl: 11        Objective   Physical Examination:    Visit Vitals  /71   Pulse 69   Wt 82.3 kg (181 lb 6.4 oz)   SpO2 96%   BMI 30.19 kg/m²   OB  Status Postmenopausal   Smoking Status Former   BSA 1.94 m²        Gen: NAD, A&O x 3  HEENT: clear sclera and conjunctiva,     Musculoskeletal:   Neck; WNL, full ROM  Shoulder: WNL, full ROM  Elbow:WNL, full ROM, no effusion noted  Wrist and fingers;no active synovitis noted, Full ROM in the Wrist , Good fist and   Knees:  No effusions or crepitation, full ROM.  Hips; WNL, full ROM, Negative Hugo test  Ankle, Feet; WNL, full ROM    Skin: No rashes or lesions seen, no nail changes  Neuro: A&O x3, Normal Gait    Procedures :None    Orders:  No orders of the defined types were placed in this encounter.       Provider Impression:   Assessment/Plan   Encounter Diagnosis   Name Primary?    Age-related osteoporosis with current pathological fracture, initial encounter Yes       64 y.o.  female with DJD of various joint and osteoporosis present for evaluation.   -Continue with Fosamax 70 mg weekly  -The patient should maintain intake of supplementation of calcium 1200 mg in divided doses and vitamin D 2000 units,  -Avoid activity that puts her at risk for fracture  -Engagement in regular weight bearing and muscle strengthening exercises as tolerated.   - Excessive caffeine, Tobacco use and alcohol use should be avoided.  -Patient should have a repeat BMD testing in February 2027  -Follow-up in 12 months

## 2025-09-03 ENCOUNTER — TELEPHONE (OUTPATIENT)
Dept: PAIN MEDICINE | Facility: CLINIC | Age: 65
End: 2025-09-03
Payer: MEDICARE

## 2026-07-14 ENCOUNTER — APPOINTMENT (OUTPATIENT)
Dept: RHEUMATOLOGY | Facility: CLINIC | Age: 66
End: 2026-07-14
Payer: MEDICARE